# Patient Record
Sex: FEMALE | Race: WHITE | NOT HISPANIC OR LATINO | Employment: FULL TIME | URBAN - METROPOLITAN AREA
[De-identification: names, ages, dates, MRNs, and addresses within clinical notes are randomized per-mention and may not be internally consistent; named-entity substitution may affect disease eponyms.]

---

## 2020-04-28 LAB
EXTERNAL ABO GROUPING: NORMAL
EXTERNAL ANTIBODY SCREEN: NEGATIVE
EXTERNAL GROUP B STREP ANTIGEN: NORMAL
EXTERNAL HEPATITIS B SURFACE ANTIGEN: NEGATIVE
EXTERNAL HEPATITIS C AB: NORMAL
EXTERNAL RH FACTOR: NEGATIVE
EXTERNAL RUBELLA QUALITATIVE: NORMAL
EXTERNAL SYPHILIS RPR SCREEN: NORMAL
HIV1 P24 AG SERPL QL IA: NEGATIVE

## 2020-11-17 ENCOUNTER — ANESTHESIA (OUTPATIENT)
Dept: LABOR AND DELIVERY | Facility: HOSPITAL | Age: 30
End: 2020-11-17

## 2020-11-17 ENCOUNTER — HOSPITAL ENCOUNTER (INPATIENT)
Facility: HOSPITAL | Age: 30
LOS: 4 days | Discharge: HOME | End: 2020-11-21
Attending: OBSTETRICS & GYNECOLOGY | Admitting: OBSTETRICS & GYNECOLOGY

## 2020-11-17 ENCOUNTER — ANESTHESIA EVENT (OUTPATIENT)
Dept: LABOR AND DELIVERY | Facility: HOSPITAL | Age: 30
End: 2020-11-17

## 2020-11-17 PROBLEM — Z34.90 PREGNANCY: Status: ACTIVE | Noted: 2020-11-17

## 2020-11-17 LAB
ABO GROUP BLD: NORMAL
BLD GP AB SCN SERPL QL: POSITIVE
DEPRECATED RDW RBC AUTO: 40.9 FL (ref 37–54)
ERYTHROCYTE [DISTWIDTH] IN BLOOD BY AUTOMATED COUNT: 12.3 % (ref 12.3–15.4)
HCT VFR BLD AUTO: 37.2 % (ref 34–46.6)
HGB BLD-MCNC: 12.4 G/DL (ref 12–15.9)
MCH RBC QN AUTO: 30.6 PG (ref 26.6–33)
MCHC RBC AUTO-ENTMCNC: 33.3 G/DL (ref 31.5–35.7)
MCV RBC AUTO: 91.9 FL (ref 79–97)
PLATELET # BLD AUTO: 250 10*3/MM3 (ref 140–450)
PMV BLD AUTO: 11.4 FL (ref 6–12)
RBC # BLD AUTO: 4.05 10*6/MM3 (ref 3.77–5.28)
RESIDUAL RHIG DETECTED: NORMAL
RH BLD: NEGATIVE
SARS-COV-2 RDRP RESP QL NAA+PROBE: NOT DETECTED
T&S EXPIRATION DATE: NORMAL
WBC # BLD AUTO: 15.84 10*3/MM3 (ref 3.4–10.8)

## 2020-11-17 PROCEDURE — 86870 RBC ANTIBODY IDENTIFICATION: CPT | Performed by: OBSTETRICS & GYNECOLOGY

## 2020-11-17 PROCEDURE — 25010000002 HYDROMORPHONE PER 4 MG: Performed by: ANESTHESIOLOGY

## 2020-11-17 PROCEDURE — 25010000002 ONDANSETRON PER 1 MG: Performed by: ANESTHESIOLOGY

## 2020-11-17 PROCEDURE — 88307 TISSUE EXAM BY PATHOLOGIST: CPT

## 2020-11-17 PROCEDURE — 85027 COMPLETE CBC AUTOMATED: CPT | Performed by: OBSTETRICS & GYNECOLOGY

## 2020-11-17 PROCEDURE — 86850 RBC ANTIBODY SCREEN: CPT | Performed by: OBSTETRICS & GYNECOLOGY

## 2020-11-17 PROCEDURE — 87635 SARS-COV-2 COVID-19 AMP PRB: CPT | Performed by: OBSTETRICS & GYNECOLOGY

## 2020-11-17 PROCEDURE — 25010000002 FENTANYL CITRATE (PF) 100 MCG/2ML SOLUTION: Performed by: ANESTHESIOLOGY

## 2020-11-17 PROCEDURE — 25010000002 MORPHINE PER 10 MG: Performed by: ANESTHESIOLOGY

## 2020-11-17 PROCEDURE — 86901 BLOOD TYPING SEROLOGIC RH(D): CPT | Performed by: OBSTETRICS & GYNECOLOGY

## 2020-11-17 PROCEDURE — 25010000002 PHENYLEPHRINE PER 1 ML: Performed by: ANESTHESIOLOGY

## 2020-11-17 PROCEDURE — 25010000003 CEFAZOLIN IN DEXTROSE 2-4 GM/100ML-% SOLUTION: Performed by: OBSTETRICS & GYNECOLOGY

## 2020-11-17 PROCEDURE — 86900 BLOOD TYPING SEROLOGIC ABO: CPT | Performed by: OBSTETRICS & GYNECOLOGY

## 2020-11-17 RX ORDER — HYDROMORPHONE HYDROCHLORIDE 1 MG/ML
0.5 INJECTION, SOLUTION INTRAMUSCULAR; INTRAVENOUS; SUBCUTANEOUS
Status: DISCONTINUED | OUTPATIENT
Start: 2020-11-17 | End: 2020-11-17 | Stop reason: HOSPADM

## 2020-11-17 RX ORDER — ONDANSETRON 4 MG/1
4 TABLET, FILM COATED ORAL EVERY 8 HOURS PRN
Status: DISCONTINUED | OUTPATIENT
Start: 2020-11-17 | End: 2020-11-21 | Stop reason: HOSPADM

## 2020-11-17 RX ORDER — FAMOTIDINE 10 MG/ML
20 INJECTION, SOLUTION INTRAVENOUS ONCE AS NEEDED
Status: COMPLETED | OUTPATIENT
Start: 2020-11-17 | End: 2020-11-17

## 2020-11-17 RX ORDER — MORPHINE SULFATE 2 MG/ML
2 INJECTION, SOLUTION INTRAMUSCULAR; INTRAVENOUS EVERY 4 HOURS PRN
Status: DISCONTINUED | OUTPATIENT
Start: 2020-11-17 | End: 2020-11-21 | Stop reason: HOSPADM

## 2020-11-17 RX ORDER — DIPHENHYDRAMINE HCL 25 MG
25 CAPSULE ORAL EVERY 4 HOURS PRN
Status: DISCONTINUED | OUTPATIENT
Start: 2020-11-17 | End: 2020-11-21 | Stop reason: HOSPADM

## 2020-11-17 RX ORDER — HYDROCORTISONE 25 MG/G
CREAM TOPICAL 3 TIMES DAILY PRN
Status: DISCONTINUED | OUTPATIENT
Start: 2020-11-17 | End: 2020-11-21 | Stop reason: HOSPADM

## 2020-11-17 RX ORDER — OXYTOCIN-SODIUM CHLORIDE 0.9% IV SOLN 30 UNIT/500ML 30-0.9/5 UT/ML-%
125 SOLUTION INTRAVENOUS CONTINUOUS PRN
Status: COMPLETED | OUTPATIENT
Start: 2020-11-17 | End: 2020-11-17

## 2020-11-17 RX ORDER — GLYCOPYRROLATE 0.2 MG/ML
INJECTION INTRAMUSCULAR; INTRAVENOUS AS NEEDED
Status: DISCONTINUED | OUTPATIENT
Start: 2020-11-17 | End: 2020-11-17 | Stop reason: SURG

## 2020-11-17 RX ORDER — MORPHINE SULFATE 1 MG/ML
INJECTION, SOLUTION EPIDURAL; INTRATHECAL; INTRAVENOUS
Status: COMPLETED | OUTPATIENT
Start: 2020-11-17 | End: 2020-11-17

## 2020-11-17 RX ORDER — MISOPROSTOL 200 UG/1
800 TABLET ORAL AS NEEDED
Status: DISCONTINUED | OUTPATIENT
Start: 2020-11-17 | End: 2020-11-17 | Stop reason: HOSPADM

## 2020-11-17 RX ORDER — LANOLIN
CREAM (ML) TOPICAL
Status: DISCONTINUED | OUTPATIENT
Start: 2020-11-17 | End: 2020-11-21 | Stop reason: HOSPADM

## 2020-11-17 RX ORDER — SODIUM CHLORIDE 0.9 % (FLUSH) 0.9 %
3 SYRINGE (ML) INJECTION EVERY 12 HOURS SCHEDULED
Status: DISCONTINUED | OUTPATIENT
Start: 2020-11-17 | End: 2020-11-19

## 2020-11-17 RX ORDER — OXYCODONE AND ACETAMINOPHEN 10; 325 MG/1; MG/1
1 TABLET ORAL EVERY 4 HOURS PRN
Status: DISCONTINUED | OUTPATIENT
Start: 2020-11-17 | End: 2020-11-21 | Stop reason: HOSPADM

## 2020-11-17 RX ORDER — BUPIVACAINE HYDROCHLORIDE 7.5 MG/ML
INJECTION, SOLUTION EPIDURAL; RETROBULBAR
Status: COMPLETED | OUTPATIENT
Start: 2020-11-17 | End: 2020-11-17

## 2020-11-17 RX ORDER — CARBOPROST TROMETHAMINE 250 UG/ML
250 INJECTION, SOLUTION INTRAMUSCULAR AS NEEDED
Status: DISCONTINUED | OUTPATIENT
Start: 2020-11-17 | End: 2020-11-17 | Stop reason: HOSPADM

## 2020-11-17 RX ORDER — HYDROXYZINE 50 MG/1
50 TABLET, FILM COATED ORAL EVERY 6 HOURS PRN
Status: DISCONTINUED | OUTPATIENT
Start: 2020-11-17 | End: 2020-11-21 | Stop reason: HOSPADM

## 2020-11-17 RX ORDER — MORPHINE SULFATE 2 MG/ML
2 INJECTION, SOLUTION INTRAMUSCULAR; INTRAVENOUS
Status: ACTIVE | OUTPATIENT
Start: 2020-11-17 | End: 2020-11-18

## 2020-11-17 RX ORDER — ONDANSETRON 2 MG/ML
4 INJECTION INTRAMUSCULAR; INTRAVENOUS ONCE AS NEEDED
Status: COMPLETED | OUTPATIENT
Start: 2020-11-17 | End: 2020-11-17

## 2020-11-17 RX ORDER — SIMETHICONE 80 MG
80 TABLET,CHEWABLE ORAL 4 TIMES DAILY PRN
Status: DISCONTINUED | OUTPATIENT
Start: 2020-11-17 | End: 2020-11-21 | Stop reason: HOSPADM

## 2020-11-17 RX ORDER — FENTANYL CITRATE 50 UG/ML
INJECTION, SOLUTION INTRAMUSCULAR; INTRAVENOUS
Status: COMPLETED | OUTPATIENT
Start: 2020-11-17 | End: 2020-11-17

## 2020-11-17 RX ORDER — ERYTHROMYCIN 5 MG/G
OINTMENT OPHTHALMIC
Status: DISPENSED
Start: 2020-11-17 | End: 2020-11-17

## 2020-11-17 RX ORDER — DIPHENHYDRAMINE HYDROCHLORIDE 50 MG/ML
25 INJECTION INTRAMUSCULAR; INTRAVENOUS EVERY 4 HOURS PRN
Status: DISCONTINUED | OUTPATIENT
Start: 2020-11-17 | End: 2020-11-21 | Stop reason: HOSPADM

## 2020-11-17 RX ORDER — CEFAZOLIN SODIUM 2 G/100ML
2 INJECTION, SOLUTION INTRAVENOUS ONCE
Status: COMPLETED | OUTPATIENT
Start: 2020-11-17 | End: 2020-11-17

## 2020-11-17 RX ORDER — PRENATAL VIT NO.126/IRON/FOLIC 28MG-0.8MG
1 TABLET ORAL DAILY
COMMUNITY

## 2020-11-17 RX ORDER — IBUPROFEN 800 MG/1
800 TABLET ORAL EVERY 8 HOURS PRN
Status: DISCONTINUED | OUTPATIENT
Start: 2020-11-17 | End: 2020-11-21 | Stop reason: HOSPADM

## 2020-11-17 RX ORDER — PROMETHAZINE HYDROCHLORIDE 12.5 MG/1
12.5 SUPPOSITORY RECTAL EVERY 6 HOURS PRN
Status: DISCONTINUED | OUTPATIENT
Start: 2020-11-17 | End: 2020-11-21 | Stop reason: HOSPADM

## 2020-11-17 RX ORDER — PROMETHAZINE HYDROCHLORIDE 25 MG/1
25 TABLET ORAL EVERY 6 HOURS PRN
Status: DISCONTINUED | OUTPATIENT
Start: 2020-11-17 | End: 2020-11-21 | Stop reason: HOSPADM

## 2020-11-17 RX ORDER — DOCUSATE SODIUM 100 MG/1
100 CAPSULE, LIQUID FILLED ORAL 2 TIMES DAILY PRN
Status: DISCONTINUED | OUTPATIENT
Start: 2020-11-17 | End: 2020-11-21 | Stop reason: HOSPADM

## 2020-11-17 RX ORDER — ALBUTEROL SULFATE 90 UG/1
2 AEROSOL, METERED RESPIRATORY (INHALATION) EVERY 4 HOURS PRN
COMMUNITY

## 2020-11-17 RX ORDER — OXYTOCIN-SODIUM CHLORIDE 0.9% IV SOLN 30 UNIT/500ML 30-0.9/5 UT/ML-%
999 SOLUTION INTRAVENOUS ONCE
Status: COMPLETED | OUTPATIENT
Start: 2020-11-17 | End: 2020-11-17

## 2020-11-17 RX ORDER — ONDANSETRON 2 MG/ML
4 INJECTION INTRAMUSCULAR; INTRAVENOUS EVERY 8 HOURS PRN
Status: DISCONTINUED | OUTPATIENT
Start: 2020-11-17 | End: 2020-11-21 | Stop reason: HOSPADM

## 2020-11-17 RX ORDER — SODIUM CHLORIDE 0.9 % (FLUSH) 0.9 %
3-10 SYRINGE (ML) INJECTION AS NEEDED
Status: DISCONTINUED | OUTPATIENT
Start: 2020-11-17 | End: 2020-11-21 | Stop reason: HOSPADM

## 2020-11-17 RX ORDER — SODIUM CHLORIDE, SODIUM LACTATE, POTASSIUM CHLORIDE, CALCIUM CHLORIDE 600; 310; 30; 20 MG/100ML; MG/100ML; MG/100ML; MG/100ML
125 INJECTION, SOLUTION INTRAVENOUS CONTINUOUS
Status: DISCONTINUED | OUTPATIENT
Start: 2020-11-17 | End: 2020-11-17

## 2020-11-17 RX ORDER — OXYCODONE HYDROCHLORIDE AND ACETAMINOPHEN 5; 325 MG/1; MG/1
1 TABLET ORAL EVERY 4 HOURS PRN
Status: DISCONTINUED | OUTPATIENT
Start: 2020-11-17 | End: 2020-11-21 | Stop reason: HOSPADM

## 2020-11-17 RX ORDER — NALOXONE HCL 0.4 MG/ML
0.2 VIAL (ML) INJECTION
Status: DISCONTINUED | OUTPATIENT
Start: 2020-11-17 | End: 2020-11-21 | Stop reason: HOSPADM

## 2020-11-17 RX ORDER — METHYLERGONOVINE MALEATE 0.2 MG/ML
200 INJECTION INTRAVENOUS ONCE AS NEEDED
Status: DISCONTINUED | OUTPATIENT
Start: 2020-11-17 | End: 2020-11-17 | Stop reason: HOSPADM

## 2020-11-17 RX ORDER — MISOPROSTOL 200 UG/1
600 TABLET ORAL ONCE AS NEEDED
Status: DISCONTINUED | OUTPATIENT
Start: 2020-11-17 | End: 2020-11-21 | Stop reason: HOSPADM

## 2020-11-17 RX ORDER — ACETAMINOPHEN 325 MG/1
650 TABLET ORAL ONCE
Status: COMPLETED | OUTPATIENT
Start: 2020-11-17 | End: 2020-11-17

## 2020-11-17 RX ORDER — SODIUM CHLORIDE 0.9 % (FLUSH) 0.9 %
10 SYRINGE (ML) INJECTION AS NEEDED
Status: DISCONTINUED | OUTPATIENT
Start: 2020-11-17 | End: 2020-11-17 | Stop reason: HOSPADM

## 2020-11-17 RX ORDER — PHYTONADIONE 1 MG/.5ML
INJECTION, EMULSION INTRAMUSCULAR; INTRAVENOUS; SUBCUTANEOUS
Status: DISPENSED
Start: 2020-11-17 | End: 2020-11-17

## 2020-11-17 RX ORDER — SODIUM CHLORIDE, SODIUM LACTATE, POTASSIUM CHLORIDE, CALCIUM CHLORIDE 600; 310; 30; 20 MG/100ML; MG/100ML; MG/100ML; MG/100ML
125 INJECTION, SOLUTION INTRAVENOUS CONTINUOUS
Status: DISCONTINUED | OUTPATIENT
Start: 2020-11-17 | End: 2020-11-21 | Stop reason: HOSPADM

## 2020-11-17 RX ORDER — EPHEDRINE SULFATE 50 MG/ML
INJECTION, SOLUTION INTRAVENOUS AS NEEDED
Status: DISCONTINUED | OUTPATIENT
Start: 2020-11-17 | End: 2020-11-17 | Stop reason: SURG

## 2020-11-17 RX ORDER — OXYTOCIN-SODIUM CHLORIDE 0.9% IV SOLN 30 UNIT/500ML 30-0.9/5 UT/ML-%
250 SOLUTION INTRAVENOUS CONTINUOUS PRN
Status: ACTIVE | OUTPATIENT
Start: 2020-11-17 | End: 2020-11-17

## 2020-11-17 RX ORDER — NALOXONE HCL 0.4 MG/ML
0.4 VIAL (ML) INJECTION
Status: DISCONTINUED | OUTPATIENT
Start: 2020-11-17 | End: 2020-11-21 | Stop reason: HOSPADM

## 2020-11-17 RX ADMIN — IBUPROFEN 800 MG: 800 TABLET ORAL at 14:51

## 2020-11-17 RX ADMIN — OXYTOCIN 125 ML/HR: 10 INJECTION INTRAVENOUS at 11:23

## 2020-11-17 RX ADMIN — IBUPROFEN 800 MG: 800 TABLET ORAL at 22:24

## 2020-11-17 RX ADMIN — MORPHINE SULFATE 250 MCG: 1 INJECTION, SOLUTION EPIDURAL; INTRATHECAL; INTRAVENOUS at 09:20

## 2020-11-17 RX ADMIN — SODIUM CHLORIDE, POTASSIUM CHLORIDE, SODIUM LACTATE AND CALCIUM CHLORIDE 1000 ML: 600; 310; 30; 20 INJECTION, SOLUTION INTRAVENOUS at 07:20

## 2020-11-17 RX ADMIN — FAMOTIDINE 20 MG: 10 INJECTION INTRAVENOUS at 08:55

## 2020-11-17 RX ADMIN — HYDROMORPHONE HYDROCHLORIDE 0.5 MG: 1 INJECTION, SOLUTION INTRAMUSCULAR; INTRAVENOUS; SUBCUTANEOUS at 11:22

## 2020-11-17 RX ADMIN — HYDROMORPHONE HYDROCHLORIDE 0.5 MG: 1 INJECTION, SOLUTION INTRAMUSCULAR; INTRAVENOUS; SUBCUTANEOUS at 12:10

## 2020-11-17 RX ADMIN — ACETAMINOPHEN 650 MG: 325 TABLET, FILM COATED ORAL at 08:56

## 2020-11-17 RX ADMIN — OXYTOCIN 999 ML/HR: 10 INJECTION INTRAVENOUS at 09:36

## 2020-11-17 RX ADMIN — SODIUM CHLORIDE, POTASSIUM CHLORIDE, SODIUM LACTATE AND CALCIUM CHLORIDE 125 ML/HR: 600; 310; 30; 20 INJECTION, SOLUTION INTRAVENOUS at 09:07

## 2020-11-17 RX ADMIN — FENTANYL CITRATE 20 MCG: 50 INJECTION INTRAMUSCULAR; INTRAVENOUS at 09:20

## 2020-11-17 RX ADMIN — OXYCODONE HYDROCHLORIDE AND ACETAMINOPHEN 1 TABLET: 10; 325 TABLET ORAL at 22:24

## 2020-11-17 RX ADMIN — EPHEDRINE SULFATE 5 MG: 50 INJECTION INTRAVENOUS at 09:24

## 2020-11-17 RX ADMIN — CEFAZOLIN SODIUM 2 G: 2 INJECTION, SOLUTION INTRAVENOUS at 09:06

## 2020-11-17 RX ADMIN — BUPIVACAINE HYDROCHLORIDE 1.6 ML: 7.5 INJECTION, SOLUTION EPIDURAL; RETROBULBAR at 09:20

## 2020-11-17 RX ADMIN — OXYCODONE HYDROCHLORIDE AND ACETAMINOPHEN 1 TABLET: 10; 325 TABLET ORAL at 14:51

## 2020-11-17 RX ADMIN — ONDANSETRON HYDROCHLORIDE 4 MG: 2 SOLUTION INTRAMUSCULAR; INTRAVENOUS at 13:28

## 2020-11-17 RX ADMIN — DOCUSATE SODIUM 100 MG: 100 CAPSULE ORAL at 22:24

## 2020-11-17 RX ADMIN — GLYCOPYRROLATE 0.2 MG: 0.2 INJECTION INTRAMUSCULAR; INTRAVENOUS at 09:29

## 2020-11-17 RX ADMIN — PHENYLEPHRINE HYDROCHLORIDE 100 MCG: 10 INJECTION INTRAVENOUS at 09:24

## 2020-11-17 NOTE — ANESTHESIA PREPROCEDURE EVALUATION
Anesthesia Evaluation     Patient summary reviewed and Nursing notes reviewed   NPO Solid Status: > 8 hours  NPO Liquid Status: > 8 hours           Airway   Mallampati: II  Neck ROM: full  No difficulty expected  Dental - normal exam     Pulmonary     breath sounds clear to auscultation  (+) asthma,  Cardiovascular     Rhythm: regular        Neuro/Psych  GI/Hepatic/Renal/Endo      Musculoskeletal     Abdominal    Substance History      OB/GYN    (+) Pregnant,         Other                        Anesthesia Plan    ASA 2     spinal   (  39w0d  breech)    Anesthetic plan, all risks, benefits, and alternatives have been provided, discussed and informed consent has been obtained with: patient.

## 2020-11-17 NOTE — ANESTHESIA PROCEDURE NOTES
Spinal Block      Patient reassessed immediately prior to procedure    Patient location during procedure: OR  Indication:at surgeon's request  Performed By  Anesthesiologist: Wily Olivera MD  Preanesthetic Checklist  Completed: patient identified, site marked, surgical consent, pre-op evaluation, timeout performed, IV checked, risks and benefits discussed and monitors and equipment checked  Spinal Block Prep:  Patient Position:sitting  Sterile Tech:cap, gloves, mask and sterile barriers  Prep:Chloraprep  Patient Monitoring:blood pressure monitoring and continuous pulse oximetry  Spinal Block Procedure  Approach:midline  Guidance:landmark technique  Location:L3-L4  Needle Type:Sprotte  Needle Gauge:24  Placement of Spinal needle event:cerebrospinal fluid aspirated  Paresthesia: no  Fluid Appearance:clear  Medications: bupivacaine PF (MARCAINE) 0.75 % injection, 1.6 mL  Morphine PF injection, 250 mcg  fentaNYL citrate (PF) (SUBLIMAZE) injection, 20 mcg  Med Administered at 11/17/2020 9:20 AM   Post Assessment  Patient Tolerance:patient tolerated the procedure well with no apparent complications  Complications no

## 2020-11-17 NOTE — LACTATION NOTE
This note was copied from a baby's chart.  P1 term baby, 8 hrs old, has nursed a few times per Mom. Discussed how to know baby is getting enough breast milk. She has a personal pump.

## 2020-11-17 NOTE — H&P
"[facility]  Obstetric Preop History and Physical:    Patient Name: Wilda Phelps  :  1990  MRN:  [id number]      Chief Complaint   Patient presents with   • Scheduled      39wk breech       Subjective                30 y.o. female  currently at 39w0d, who presents for primary ltcd secondary to breech presentation. She did not want a version.  Her pnc has been complicated by rh negative status and mild asthma.  She is having a girl \"yesica\".               Past OB History:       OB History    Para Term  AB Living   1 0 0 0 0 0   SAB TAB Ectopic Molar Multiple Live Births   0 0 0 0 0 0      # Outcome Date GA Lbr Zac/2nd Weight Sex Delivery Anes PTL Lv   1 Current                Past Medical History: Past Medical History:   Diagnosis Date   • Abnormal Pap smear of cervix    • Asthma       Past Surgical History Past Surgical History:   Procedure Laterality Date   • ANKLE ARTHROSCOPY Right ,       Family History: History reviewed. No pertinent family history.   Social History:  reports that she has never smoked. She has never used smokeless tobacco.   reports previous alcohol use.   reports no history of drug use.    Allergies:     Patient has no known allergies.     Objective       Vital Signs Range for the last 24 hours  Temperature: Temp:  [98 °F (36.7 °C)] 98 °F (36.7 °C)   Temp Source: Temp src: Oral   BP: BP: (116)/(58) 116/58   Pulse: Heart Rate:  [81] 81   Respirations: Resp:  [18] 18   SPO2: SpO2:  [98 %] 98 %      lactated ringers, 125 mL/hr, Last Rate: 125 mL/hr (20 0907)  oxytocin, 250 mL/hr    Followed by  oxytocin, 125 mL/hr                       Physical Exam:  General: Alert in NAD   Heart Normal rate and regular rhythm   Lungs Clear to auscultation bilaterally     Abdomen Gravid, soft, no masses   Extremities  edema         Cervix: Exam by:     Dilation:     Effacement:     Station:                               FHR:   TOCO:            Assessment/Plan "     Assessment: 39wks g1 with breech presentation for primary ltcd    Plan: Recommend proceeding with  section for delivery. Indications for proceeding, risks and benefits have been discussed with patient. All questions answered.         Whit Julio MD  2020  09:07 EST

## 2020-11-17 NOTE — PLAN OF CARE
Goal Outcome Evaluation:  Plan of Care Reviewed With: patient  Progress: improving  Outcome Summary: Stable, pain well controlled with Po pain meds, all initial checks complete, Iv fluids completed, sliv, mom is attempting to breastfeed

## 2020-11-17 NOTE — ANESTHESIA POSTPROCEDURE EVALUATION
"Patient: Wilda Phelps    Procedure Summary     Date: 20 Room / Location:  [facility]    Anesthesia Start: 909 Anesthesia Stop:     Procedure:  SECTION PRIMARY (N/A Abdomen) Diagnosis:     Surgeon: Whit Julio MD Provider: Wily Olivera MD    Anesthesia Type: spinal ASA Status: 2          Anesthesia Type: spinal    Vitals  Vitals Value Taken Time   /70 20 1146   Temp 36.4 °C (97.5 °F) 20 1013   Pulse 61 20 1150   Resp 17 20 1200   SpO2 99 % 20 1150           Post Anesthesia Care and Evaluation    Patient location during evaluation: bedside  Patient participation: complete - patient participated  Level of consciousness: sleepy but conscious  Pain score: 0  Pain management: adequate  Airway patency: patent  Anesthetic complications: No anesthetic complications    Cardiovascular status: acceptable  Respiratory status: acceptable  Hydration status: acceptable    Comments: /70   Pulse 61   Temp 36.4 °C (97.5 °F) (Oral)   Resp 17   Ht 167.6 cm (66\")   Wt 73.9 kg (163 lb)   SpO2 99%   Breastfeeding Yes   BMI 26.31 kg/m²         "

## 2020-11-18 LAB
BASOPHILS # BLD AUTO: 0.08 10*3/MM3 (ref 0–0.2)
BASOPHILS NFR BLD AUTO: 0.5 % (ref 0–1.5)
DEPRECATED RDW RBC AUTO: 41.3 FL (ref 37–54)
EOSINOPHIL # BLD AUTO: 0.36 10*3/MM3 (ref 0–0.4)
EOSINOPHIL NFR BLD AUTO: 2.3 % (ref 0.3–6.2)
ERYTHROCYTE [DISTWIDTH] IN BLOOD BY AUTOMATED COUNT: 12.4 % (ref 12.3–15.4)
HCT VFR BLD AUTO: 32.7 % (ref 34–46.6)
HGB BLD-MCNC: 10.8 G/DL (ref 12–15.9)
IMM GRANULOCYTES # BLD AUTO: 0.06 10*3/MM3 (ref 0–0.05)
IMM GRANULOCYTES NFR BLD AUTO: 0.4 % (ref 0–0.5)
LYMPHOCYTES # BLD AUTO: 2.99 10*3/MM3 (ref 0.7–3.1)
LYMPHOCYTES NFR BLD AUTO: 19.3 % (ref 19.6–45.3)
MCH RBC QN AUTO: 30.6 PG (ref 26.6–33)
MCHC RBC AUTO-ENTMCNC: 33 G/DL (ref 31.5–35.7)
MCV RBC AUTO: 92.6 FL (ref 79–97)
MONOCYTES # BLD AUTO: 1.05 10*3/MM3 (ref 0.1–0.9)
MONOCYTES NFR BLD AUTO: 6.8 % (ref 5–12)
NEUTROPHILS NFR BLD AUTO: 10.99 10*3/MM3 (ref 1.7–7)
NEUTROPHILS NFR BLD AUTO: 70.7 % (ref 42.7–76)
NRBC BLD AUTO-RTO: 0 /100 WBC (ref 0–0.2)
PLATELET # BLD AUTO: 213 10*3/MM3 (ref 140–450)
PMV BLD AUTO: 11.6 FL (ref 6–12)
RBC # BLD AUTO: 3.53 10*6/MM3 (ref 3.77–5.28)
WBC # BLD AUTO: 15.53 10*3/MM3 (ref 3.4–10.8)

## 2020-11-18 PROCEDURE — 85025 COMPLETE CBC W/AUTO DIFF WBC: CPT | Performed by: OBSTETRICS & GYNECOLOGY

## 2020-11-18 RX ADMIN — OXYCODONE HYDROCHLORIDE AND ACETAMINOPHEN 1 TABLET: 10; 325 TABLET ORAL at 09:05

## 2020-11-18 RX ADMIN — OXYCODONE HYDROCHLORIDE AND ACETAMINOPHEN 1 TABLET: 10; 325 TABLET ORAL at 04:26

## 2020-11-18 RX ADMIN — OXYCODONE HYDROCHLORIDE AND ACETAMINOPHEN 1 TABLET: 10; 325 TABLET ORAL at 14:48

## 2020-11-18 RX ADMIN — OXYCODONE HYDROCHLORIDE AND ACETAMINOPHEN 1 TABLET: 10; 325 TABLET ORAL at 18:54

## 2020-11-18 RX ADMIN — DOCUSATE SODIUM 100 MG: 100 CAPSULE ORAL at 21:02

## 2020-11-18 RX ADMIN — OXYCODONE HYDROCHLORIDE AND ACETAMINOPHEN 1 TABLET: 10; 325 TABLET ORAL at 22:50

## 2020-11-18 RX ADMIN — IBUPROFEN 800 MG: 800 TABLET ORAL at 08:35

## 2020-11-18 RX ADMIN — DOCUSATE SODIUM 100 MG: 100 CAPSULE ORAL at 08:36

## 2020-11-18 RX ADMIN — IBUPROFEN 800 MG: 800 TABLET ORAL at 17:28

## 2020-11-18 NOTE — LACTATION NOTE
This note was copied from a baby's chart.  Mother reports that infant has been nursing/voiding well, denies any questions or concerns. Denies any pain or discomfort. Encouraged mother to call with any needs.

## 2020-11-18 NOTE — L&D DELIVERY NOTE
[facility]   Section Operative Note    Patient Name: Wilda Phelps  :  1990  MRN:  [id number]      Date of procedure:  2020        Pre-Operative Dx:   1.  IUP at 39w0d weeks   2. Breech         Postoperative Dx:  Same        Procedure: Primary    via pfannensteil      Surgeon: Whit Julio MD      Assistant: Dr burris     Anesthesia: Spinal      EBL:    Quantitative EBL:  500cc    Quantitative Blood Loss (mL): 311 mL         Specimens: None  A: placenta     Findings:                           Amniotic Fluid nl  Placenta Intact, 3 VC.. manual removal due to very adherent to uterus  Uterus normal  Tubes and ovaries normal bilaterally              Infant:           Gender: Viable female  infant     Weight: 2565 g (5 lb 10.5 oz)     Apgars: 8  @ 1 minute /     9  @ 5 minutes                 Indication for C/Section:     Breech               Procedure Details:  The patient was taken to the operating room where spinal anesthesia was found to be adequate. She was prepped and draped in the usual sterile manner in the dorsal supine position with leftward tilt. A Pfannenstiel incision was made and carried down to the fascia. The fascia was incised in the mid-line and extended transversely with Hilario scissors. The fascia was grasped and elevated and  from the underlying rectus muscles superiorly and inferiorly. The peritoneum was identified and entered. Peritoneal incision was extended superiorly and inferiorly with good visualization of the bladder. The bladder blade was inserted and the vesicouterine peritoneum was incised transversely and the bladder flap was created digitally. The bladder blade was reinserted. The  lower uterine segment was incised in a transverse fashion.  The buttocks/hip were elevated and delivered through the incision. Followed by the torson, arms and the head was then flexed and easily delivered.  The remainder of the infant was delivered without  difficulty. The umbilical cord was clamped and cut and the infant was handed off the field. Cord ph and cord bloods were obtained. The placenta was removed intact and appeared normal. The uterine incision was closed with running locked sutures of 0vicryl. Second layer closure was placed imbricating the first for hemostasis. The abdominal cavity was irrigated and cleared of all clot and debris. The uterine incision was inspected and noted to be hemostatic. Rectus muscles were reapproximated in the midline with 0 vicryl.  The fascia was closed with 0 vicryl in running continuous fashion. The subcutaneous tissue was closed with 3-0 vicyrl. The skin was closed in subcuticular fashion with 4-0 Vicryl.   Instrument, sponge, and needle counts were correct prior the abdominal closure and at the conclusion of the case. Mother and baby  to recovery room in stable condition.              Complications:     None          Antibiotics: cefazolin (Ancef)                      Whit Julio MD  11/18/2020  10:13 EST

## 2020-11-18 NOTE — PROGRESS NOTES
[facility]   PROGRESS NOTE    Patient Name: Wilda Phelps  :  1990  MRN:  [id number]      Post-Op Day 1 S/P    Delivered a female infant.  Subjective     Patient reports:    Pain is well controlled. Voiding and ambulating without difficulty.    Tolerating po. Lochia normal.       The patient plans to breastfeed.         Objective       Vitals: Vital Signs Range for the last 24 hours  Temperature: Temp:  [97.3 °F (36.3 °C)-98.3 °F (36.8 °C)] 98.1 °F (36.7 °C)   Temp Source: Temp src: Oral   BP: BP: (102-131)/(61-82) 102/61   Pulse: Heart Rate:  [51-94] 51   Respirations: Resp:  [16-20] 16         Intake/Output Summary (Last 24 hours) at 2020 0944  Last data filed at 2020 0748  Gross per 24 hour   Intake 1870 ml   Output 3411 ml   Net -1541 ml                                              Physical Exam     General Alert and awake, in NAD      CV Regular rate and rhythm      Lungs clear to auscultation bilaterally        Abdomen Soft, non-distended, fundus firm,  1 below umbilicus, appropriately tender      Incision  Dressing clean, dry and intact.      Extremities  tr edema, calves NT               LABS: Results from last 7 days   Lab Units 20  0531 20  0711   WBC 10*3/mm3 15.53* 15.84*   HEMOGLOBIN g/dL 10.8* 12.4   HEMATOCRIT % 32.7* 37.2   PLATELETS 10*3/mm3 213 250         Prenatal labs results reviewed:  Yes   Rubella:  Immune  Rh Status:    RH type   Date Value Ref Range Status   2020 Negative  Final                       Assessment/Plan   : 1. POD 1 S/P C/S: Hemodynamically stable.  Doing well.  Continue routine care.       Pregnancy          Eulalia Haines, APRN  2020  09:44 EST

## 2020-11-19 RX ADMIN — OXYCODONE HYDROCHLORIDE AND ACETAMINOPHEN 1 TABLET: 10; 325 TABLET ORAL at 20:42

## 2020-11-19 RX ADMIN — DOCUSATE SODIUM 100 MG: 100 CAPSULE ORAL at 20:42

## 2020-11-19 RX ADMIN — DOCUSATE SODIUM 100 MG: 100 CAPSULE ORAL at 08:02

## 2020-11-19 RX ADMIN — IBUPROFEN 800 MG: 800 TABLET ORAL at 20:42

## 2020-11-19 RX ADMIN — OXYCODONE HYDROCHLORIDE AND ACETAMINOPHEN 1 TABLET: 10; 325 TABLET ORAL at 16:47

## 2020-11-19 RX ADMIN — OXYCODONE HYDROCHLORIDE AND ACETAMINOPHEN 1 TABLET: 10; 325 TABLET ORAL at 03:07

## 2020-11-19 RX ADMIN — OXYCODONE HYDROCHLORIDE AND ACETAMINOPHEN 1 TABLET: 10; 325 TABLET ORAL at 11:44

## 2020-11-19 RX ADMIN — IBUPROFEN 800 MG: 800 TABLET ORAL at 11:44

## 2020-11-19 RX ADMIN — OXYCODONE HYDROCHLORIDE AND ACETAMINOPHEN 1 TABLET: 10; 325 TABLET ORAL at 06:50

## 2020-11-19 RX ADMIN — IBUPROFEN 800 MG: 800 TABLET ORAL at 03:07

## 2020-11-19 RX ADMIN — Medication: at 03:39

## 2020-11-19 NOTE — LACTATION NOTE
P1. Baby is small but nursing well per mom. Staying another day. Patient has questions about when to start pumping ,eyc. Has medela pump at home. Discussed how and when to start pumping and introducing bottle . Directed to safe storage of expressed milk in provided booklet.  Maternal hydration encouraged. Knows to call LC as needed.

## 2020-11-19 NOTE — PROGRESS NOTES
[facility]   PROGRESS NOTE    Patient Name: Wilda Phelps  :  1990  MRN:  [id number]      Post-Op 2 S/P   Subjective     Patient reports:   Doing well. Pain well controlled. Tolerating regular diet and having flatus.    Lochia normal.       Objective       Vitals: Vital Signs Range for the last 24 hours  Temperature: Temp:  [97.6 °F (36.4 °C)-98 °F (36.7 °C)] 98 °F (36.7 °C)       BP: BP: (107-111)/(61-66) 110/65   Pulse: Heart Rate:  [59-61] 61   Respirations: Resp:  [16] 16                                         Physical Exam     General Alert       Abdomen Soft, non-distended, fundus firm, 1 below umbilicus, appropriately tender      Incision  Intact, no erythema or exudate      Extremities Calves NT bilaterally                Assessment/Plan  :   POD 2  -  Doing well.  Continue usual cares.           Pregnancy               Eulalia Haines, NATHEN  2020  09:17 EST

## 2020-11-19 NOTE — PLAN OF CARE
Problem: Adult Inpatient Plan of Care  Goal: Plan of Care Review  Outcome: Ongoing, Progressing  Flowsheets (Taken 11/19/2020 0440)  Plan of Care Reviewed With:   patient   spouse  Outcome Summary: pt vss, resting well. pain controlled with motrin and percocet. plans to discharge in am.   Goal Outcome Evaluation:  Plan of Care Reviewed With: patient, spouse  Progress: improving  Outcome Summary: pt vss, resting well. pain controlled with motrin and percocet. plans to discharge in am.

## 2020-11-20 RX ORDER — OXYCODONE HYDROCHLORIDE AND ACETAMINOPHEN 5; 325 MG/1; MG/1
2 TABLET ORAL EVERY 4 HOURS PRN
Qty: 30 TABLET | Refills: 0 | Status: SHIPPED | OUTPATIENT
Start: 2020-11-20 | End: 2020-11-23

## 2020-11-20 RX ORDER — IBUPROFEN 800 MG/1
800 TABLET ORAL EVERY 8 HOURS PRN
Qty: 60 TABLET | Refills: 0 | Status: SHIPPED | OUTPATIENT
Start: 2020-11-20

## 2020-11-20 RX ADMIN — OXYCODONE HYDROCHLORIDE AND ACETAMINOPHEN 1 TABLET: 10; 325 TABLET ORAL at 16:41

## 2020-11-20 RX ADMIN — OXYCODONE HYDROCHLORIDE AND ACETAMINOPHEN 1 TABLET: 10; 325 TABLET ORAL at 12:42

## 2020-11-20 RX ADMIN — IBUPROFEN 800 MG: 800 TABLET ORAL at 04:32

## 2020-11-20 RX ADMIN — DOCUSATE SODIUM 100 MG: 100 CAPSULE ORAL at 08:06

## 2020-11-20 RX ADMIN — OXYCODONE HYDROCHLORIDE AND ACETAMINOPHEN 1 TABLET: 10; 325 TABLET ORAL at 06:43

## 2020-11-20 RX ADMIN — IBUPROFEN 800 MG: 800 TABLET ORAL at 12:42

## 2020-11-20 RX ADMIN — IBUPROFEN 800 MG: 800 TABLET ORAL at 20:32

## 2020-11-20 RX ADMIN — OXYCODONE HYDROCHLORIDE AND ACETAMINOPHEN 1 TABLET: 10; 325 TABLET ORAL at 20:32

## 2020-11-20 RX ADMIN — OXYCODONE HYDROCHLORIDE AND ACETAMINOPHEN 1 TABLET: 10; 325 TABLET ORAL at 01:27

## 2020-11-20 NOTE — LACTATION NOTE
This note was copied from a baby's chart.  HGP initiated and Mom pumped 20mls. Reviewed feeding plan of nursing first then supplementing with EBM and formula to equal 20 mls per MD. Will call for any assist or questions.

## 2020-11-20 NOTE — LACTATION NOTE
Patient is teary about baby having lost so much weight although she was up 20 grams today. Breasts are filling and milk is easily expressed . LC assisted with deep latch to right breast in football hold and supplied manual breast pump . Baby had a nutritvie suckle and audible swallow but rested frequently. Practiced hand expression and breast compression while infant at breast . Showed parents how infant responded to the breast compression with deeper swallows and renewed interest. Mom pumped and 12cc colostrum was fed back.  Lactation Consult Note    Evaluation Completed: 2020 12:55 EST  Patient Name: Wilda Phelps  :  1990  MRN:  [id number]     REFERRAL  INFORMATION:                          Date of Referral: 20   Person Making Referral: physician  Maternal Reason for Referral: breastfeeding currently  Infant Reason for Referral: low birth weight, sleepy    DELIVERY HISTORY:        Skin to skin initiation date/time: 2020  10:14 AM   Skin to skin end date/time:           MATERNAL ASSESSMENT:  Breast Size Issue: none (20 1244 : Hellen Chapman RN)  Breast Shape: round (20 1244 : Hellen Chapman, RN)  Breast Density: filling (20 1244 : Hellen Chapman, RN)  Areola: elastic (20 1244 : Hellen Chapman, RN)  Nipples: everted (20 1244 : Hellen Chapman, RN)                INFANT ASSESSMENT:  Information for the patient's :  Speedy Phelps [[id number]]   No past medical history on file.     Feeding Readiness Cues: hand to mouth movements, quiet, sucking motion present (20 1200 : Hellen Chapman RN)      Feeding Tolerance/Success: adequate pause for breath, coordinated suck/swallow, intermittent pauses, strong suck (20 1200 : Hellen Chapman, RN)      Satiety Cues: infant releases breast (20 1200 : Hellen Chapman, RN)                        Breastfeeding: breastfeeding, right side only (20 1200 :  Hellen Chapman, RN)   Infant Positioning: clutch/football (20 1200 : Hellen Chapman, RN)      Breastfeeding Time, Right (min): 10 (20 1200 : Hellen Chapman RN)   Effective Latch During Feeding: yes (20 1200 : Hellen Chapman RN)   Suck/Swallow Coordination: present (20 1200 : Hellen Chapman RN)   Signs of Milk Transfer: audible swallow, suck/swallow ratio, transfer present (20 1200 : Hellen Chapman, RN)       Latch: 2-->grasps breast, tongue down, lips flanged, rhythmic sucking (20 1200 : Hellen Chapman, RN)   Audible Swallowin-->spontaneous and intermittent (24 hrs old) (20 1200 : Hellen Chapman, RN)   Type of Nipple: 2-->everted (after stimulation) (20 1200 : Hellen Chapman, RN)   Comfort (Breast/Nipple): 2-->soft/nontender (20 1200 : Hellen Chapman, RN)   Hold (Positioning): 1-->minimal assist, teach one side, mother does other, staff holds (20 1200 : Hellen Chapman, RN)   Latch Score: 9 (20 1200 : Hellen Chapman RN)     Infant-Driven Feeding Scales - Readiness: Alert once handled. Some rooting or takes pacifier. Adequate tone. (20 1200 : Hellen Chapman, RN)               MATERNAL INFANT FEEDING:  Maternal Preparation: hand hygiene (20 1244 : Hellen Chapman, RN)  Maternal Emotional State: receptive, other (see comments)(tearful) (20 1244 : Hellen Chapman, RN)  Infant Positioning: clutch/football (20 1244 : Hellen Chapman, RN)   Signs of Milk Transfer: audible swallow, deep jaw excursions noted, suck/swallow ratio, transfer present (20 : Hellen Chapman, RN)  Pain with Feeding: no (20 : Hellen Chapman, RN)        Comfort Measures Before/During Feeding: suction broken using finger (20 : Hellen Chapman RN)  Milk Ejection Reflex: present (20 : Hellen Chapman RN)           Latch  Assistance: minimal assistance (11/20/20 1244 : Hellen Chapman, RN)                               EQUIPMENT TYPE:  Breast Pump Type: manual pump (11/20/20 1244 : Hellen Chapman, RN)  Breast Pump Flange Type: hard (11/20/20 1244 : Hellen Chapman, RN)  Breast Pump Flange Size: 24 mm (11/20/20 1244 : Hellen Chapman, RN)                        BREAST PUMPING:  Breast Pumping Interventions: post-feed pumping encouraged (11/20/20 1244 : Hellen Chapman, RN)       LACTATION REFERRALS:  Lactation Referrals: outpatient lactation program, support group (11/20/20 1244 : Hellen Chapman, RN)

## 2020-11-20 NOTE — DISCHARGE SUMMARY
Date of Discharge:  2020    Discharge Diagnosis: primary c/s    Presenting Problem/History of Present Illness  Pregnancy [Z34.90]       Hospital Course  Patient is a 30 y.o. female presented for scheduled c/s d/t breech presentation.  Delivered viable female infant per Dr. Julio.  No pp complications.  Ready for d/c today.      Procedures Performed  Procedure(s):   SECTION PRIMARY         Condition on Discharge:  Post-Op Day 3 S/P   Subjective     Patient reports:    Doing well - ready for discharge. Pain well controlled. Tolerating po and   having flatus. Voiding and ambulating without difficulty. Lochia normal.     Vital Signs  Temp:  [97.3 °F (36.3 °C)-98.3 °F (36.8 °C)] 97.4 °F (36.3 °C)  Heart Rate:  [56-65] 56  Resp:  [16] 16  BP: (119-125)/(76-78) 125/76    Physical Exam    Gen Alert and awake   Abdomen Soft, ND,  Fundus firm with minimal tenderness   Incision  Intact, without erythema or exudate   Extremities Calves NT bilaterally     Assessment/Plan ]   Assessment:  POD 3  -  Doing well. Stable for discharge.     Plan:    Instructions reviewed       Consults:   Consults     No orders found from 10/19/2020 to 2020.          Discharge Disposition  Home or Self Care    Discharge Medications     Discharge Medications      New Medications      Instructions Start Date   ibuprofen 800 MG tablet  Commonly known as: ADVIL,MOTRIN   800 mg, Oral, Every 8 Hours PRN      oxyCODONE-acetaminophen 5-325 MG per tablet  Commonly known as: PERCOCET   2 tablets, Oral, Every 4 Hours PRN         Continue These Medications      Instructions Start Date   albuterol sulfate  (90 Base) MCG/ACT inhaler  Commonly known as: PROVENTIL HFA;VENTOLIN HFA;PROAIR HFA   2 puffs, Inhalation, Every 4 Hours PRN      prenatal (CLASSIC) vitamin  tablet  Generic drug: prenatal vitamin   1 tablet, Oral, Daily             The patient has been prescribed a controlled substance.  She has been counseled on the risks  associated with using the medication.   The addictive potential of this medication and alternatives were discussed carefully with this patient and she demonstrated understanding.  A ABDIFATAH report has been obtained and reviewed.    Activity at Discharge: restrictions reviewed    Follow-up Appointments  No future appointments.      Test Results Pending at Discharge       NATHEN Okeefe  11/20/20  09:48 EST

## 2020-11-21 VITALS
HEIGHT: 66 IN | DIASTOLIC BLOOD PRESSURE: 74 MMHG | HEART RATE: 56 BPM | WEIGHT: 163 LBS | SYSTOLIC BLOOD PRESSURE: 131 MMHG | RESPIRATION RATE: 18 BRPM | BODY MASS INDEX: 26.2 KG/M2 | TEMPERATURE: 98.2 F | OXYGEN SATURATION: 96 %

## 2020-11-21 RX ADMIN — IBUPROFEN 800 MG: 800 TABLET ORAL at 05:00

## 2020-11-21 RX ADMIN — OXYCODONE HYDROCHLORIDE AND ACETAMINOPHEN 1 TABLET: 10; 325 TABLET ORAL at 09:10

## 2020-11-21 RX ADMIN — OXYCODONE HYDROCHLORIDE AND ACETAMINOPHEN 1 TABLET: 10; 325 TABLET ORAL at 05:00

## 2020-11-21 NOTE — DISCHARGE SUMMARY
[facility]   PROGRESS NOTE    Patient Name: Wilda Phelps  :  1990  MRN:  [id number]    Post-Op Day 3 S/P   Subjective    Was discharged yesterday but decided to stay b/c baby couldn't go.      Patient reports:    Doing well - ready for discharge. Pain well controlled. Tolerating po and   having flatus. Voiding and ambulating without difficulty. Lochia normal.     Objective       Vitals: Vital Signs Range for the last 24 hours  Temperature: Temp:  [96.7 °F (35.9 °C)-98.2 °F (36.8 °C)] 98.2 °F (36.8 °C)       BP: BP: (118-138)/(74-89) 131/74   Pulse: Heart Rate:  [56-60] 56                                                         Physical  Exam     Gen: Alert and awake                              Assessment/Plan ]   Assessment:  POD 3  -  Doing well. Stable for discharge.     Plan:    Instructions reviewed - no driving for 1 week and no narcotic pain meds.  no heavy lifting for 6  weeks, pelvic rest for 6 weeks.   Follow up in 6 weeks.   Rx on chart.         Pregnancy            Maude Mcclure MD  2020  10:30 EST

## 2020-11-24 ENCOUNTER — TELEPHONE (OUTPATIENT)
Dept: LACTATION | Facility: HOSPITAL | Age: 30
End: 2020-11-24

## 2020-11-24 NOTE — TELEPHONE ENCOUNTER
D/c follow up call: left message   wheezing. Cardiovascular: Negative for chest pain, palpitations and leg swelling. Gastrointestinal: Negative for abdominal distention, abdominal pain, blood in stool, constipation, diarrhea, nausea and vomiting. Endocrine: Negative for polydipsia, polyphagia and polyuria. Genitourinary: Negative for decreased urine volume, difficulty urinating, dysuria, enuresis, flank pain, frequency, genital sores, hematuria and urgency. Musculoskeletal: Negative for back pain, gait problem, joint swelling, neck pain and neck stiffness. Skin: Negative for color change, rash and wound. Allergic/Immunologic: Negative for environmental allergies and immunocompromised state. Neurological: Negative for dizziness, syncope, weakness, light-headedness, numbness and headaches. Psychiatric/Behavioral: Negative for agitation, confusion, dysphoric mood, self-injury, sleep disturbance and suicidal ideas. The patient is not hyperactive. PHYSICAL EXAM:  /88   Pulse 96   Temp 97.5 °F (36.4 °C) (Temporal)   Ht 5' 4\" (1.626 m)   Wt 187 lb (84.8 kg)   BMI 32.10 kg/m²   Physical Exam  Constitutional:       General: He is not in acute distress. Appearance: Normal appearance. He is well-developed. HENT:      Head: Normocephalic and atraumatic. Nose: Nose normal.   Eyes:      General: No scleral icterus. Conjunctiva/sclera: Conjunctivae normal.      Pupils: Pupils are equal, round, and reactive to light. Neck:      Musculoskeletal: Normal range of motion and neck supple. Trachea: No tracheal deviation. Cardiovascular:      Rate and Rhythm: Normal rate and regular rhythm. Pulmonary:      Effort: Pulmonary effort is normal. No respiratory distress. Breath sounds: No stridor. Abdominal:      General: There is no distension. Palpations: Abdomen is soft. There is no mass. Tenderness: There is no abdominal tenderness. Musculoskeletal:         General: No tenderness. little change in the stone. At some point he will need repeat ESWL. To prevent any scarring or damage the kidney will wait to consider this in 6 months.  - POCT Urinalysis no Micro  - XR ABDOMEN (KUB) (SINGLE AP VIEW); Future    2. Renal calculus, left  Some point he would be a candidate for treatment of the left stone      Orders Placed This Encounter   Procedures    XR ABDOMEN (KUB) (SINGLE AP VIEW)     Standing Status:   Future     Standing Expiration Date:   8/17/2021     Order Specific Question:   Reason for exam:     Answer:   bilateral rtenal calculus    POCT Urinalysis no Micro        Return in about 6 months (around 2/17/2021) for office visit after xray study. All information inputted into the note by the MA to include chief complaint, past medical history, past surgical history, medications, allergies, social and family history and review of systems has been reviewed and updated as needed by me. EMR Dragon/transcription disclaimer: Much of this documentt is electronic  transcription/translation of spoken language to printed text. The  electronic translation of spoken language may be erroneous, or at times,  nonsensical words or phrases may be inadvertently transcribed.  Although I  have reviewed the document for such errors, some may still exist.

## 2020-11-25 NOTE — TELEPHONE ENCOUNTER
Mother called [facility] stating that infant has been having difficulty nursing, will latch, but only sucks a couple of times then falls asleep. Mother has been pumping and supplementing with bottles of EBM. Encouraged mother to pump/express before latching, to express while infant latched, and to evaluate for signs of satiety after feeds. Discussed paced bottle feeding and encouraged mother to always attempt at the breast first before giving a bottle. Encouraged [facility] appt/follow up as needed.

## 2020-12-04 ENCOUNTER — TELEPHONE (OUTPATIENT)
Dept: LACTATION | Facility: HOSPITAL | Age: 30
End: 2020-12-04

## 2022-05-29 ENCOUNTER — HOSPITAL ENCOUNTER (EMERGENCY)
Dept: HOSPITAL 79 - ER1 | Age: 32
Discharge: HOME | End: 2022-05-29
Payer: SELF-PAY

## 2022-05-29 DIAGNOSIS — N83.201: Primary | ICD-10-CM

## 2022-05-29 LAB
BUN/CREATININE RATIO: 16 (ref 0–10)
HGB BLD-MCNC: 13.8 GM/DL (ref 12.3–15.3)
RED BLOOD COUNT: 4.18 M/UL (ref 4–5.1)
WHITE BLOOD COUNT: 6.5 K/UL (ref 4.5–11)

## 2023-03-22 LAB
EXTERNAL HEPATITIS B SURFACE ANTIGEN: NEGATIVE
EXTERNAL HEPATITIS C AB: NORMAL
EXTERNAL RUBELLA QUALITATIVE: NORMAL
EXTERNAL SYPHILIS RPR SCREEN: NORMAL
HIV1 P24 AG SERPL QL IA: NORMAL

## 2023-04-21 NOTE — PLAN OF CARE
Problem: Adult Inpatient Plan of Care  Goal: Plan of Care Review  Outcome: Ongoing, Progressing  Flowsheets (Taken 11/17/2020 1151)  Progress: improving  Plan of Care Reviewed With:   patient   spouse  Outcome Summary: pt and baby in rr, stable  Goal: Patient-Specific Goal (Individualized)  Outcome: Ongoing, Progressing  Goal: Absence of Hospital-Acquired Illness or Injury  Outcome: Ongoing, Progressing  Intervention: Prevent and Manage VTE (venous thromboembolism) Risk  Recent Flowsheet Documentation  Taken 11/17/2020 1143 by Viktoria Staples RN  VTE Prevention/Management:   bilateral   sequential compression devices on  Taken 11/17/2020 1130 by Viktoria Staples RN  VTE Prevention/Management:   bilateral   sequential compression devices on  Taken 11/17/2020 1115 by Viktoria Staples RN  VTE Prevention/Management:   bilateral   sequential compression devices on  Taken 11/17/2020 1100 by Viktoria Staples RN  VTE Prevention/Management:   bilateral   sequential compression devices on  Taken 11/17/2020 1045 by Viktoria Staples RN  VTE Prevention/Management:   bilateral   sequential compression devices on  Taken 11/17/2020 1030 by Viktoria Staples RN  VTE Prevention/Management:   bilateral   sequential compression devices on  Taken 11/17/2020 1011 by Viktoria Staples RN  VTE Prevention/Management:   bilateral   sequential compression devices on  Goal: Optimal Comfort and Wellbeing  Outcome: Ongoing, Progressing  Goal: Readiness for Transition of Care  Outcome: Ongoing, Progressing  Intervention: Mutually Develop Transition Plan  Recent Flowsheet Documentation  Taken 11/17/2020 0641 by Viktoria Staples RN  Transportation Concerns: car, none  Taken 11/17/2020 0639 by Viktoria Staples RN  Equipment Currently Used at Home: none  Taken 11/17/2020 0638 by Viktoria Staples RN  Transportation Anticipated: family or friend will provide  Patient/Family Anticipated Services at Transition: none  Patient/Family Anticipates Transition to: home  with family     Problem: Device-Related Complication Risk (Anesthesia/Analgesia, Neuraxial)  Goal: Safe Infusion Delivery Completion  Outcome: Ongoing, Progressing     Problem: Infection (Anesthesia/Analgesia, Neuraxial)  Goal: Absence of Infection Signs and Symptoms  Outcome: Ongoing, Progressing     Problem: Nausea and Vomiting (Anesthesia/Analgesia, Neuraxial)  Goal: Nausea and Vomiting Relief  Outcome: Ongoing, Progressing     Problem: Pain (Anesthesia/Analgesia, Neuraxial)  Goal: Effective Pain Control  Outcome: Ongoing, Progressing  Intervention: Prevent or Manage Pain  Recent Flowsheet Documentation  Taken 11/17/2020 1115 by Viktoria Staples RN  Pain Management Interventions: see MAR     Problem: Respiratory Compromise (Anesthesia/Analgesia, Neuraxial)  Goal: Effective Oxygenation and Ventilation  Outcome: Ongoing, Progressing     Problem: Sensorimotor Impairment (Anesthesia/Analgesia, Neuraxial)  Goal: Baseline Motor Function  Outcome: Ongoing, Progressing     Problem: Urinary Retention (Anesthesia/Analgesia, Neuraxial)  Goal: Effective Urinary Elimination  Outcome: Ongoing, Progressing   Goal Outcome Evaluation:            Adult

## 2023-10-26 ENCOUNTER — ANESTHESIA EVENT (OUTPATIENT)
Dept: LABOR AND DELIVERY | Facility: HOSPITAL | Age: 33
End: 2023-10-26
Payer: COMMERCIAL

## 2023-10-26 ENCOUNTER — HOSPITAL ENCOUNTER (INPATIENT)
Facility: HOSPITAL | Age: 33
LOS: 3 days | Discharge: HOME | End: 2023-10-29
Attending: OBSTETRICS & GYNECOLOGY | Admitting: OBSTETRICS & GYNECOLOGY
Payer: COMMERCIAL

## 2023-10-26 ENCOUNTER — ANESTHESIA (OUTPATIENT)
Dept: LABOR AND DELIVERY | Facility: HOSPITAL | Age: 33
End: 2023-10-26
Payer: COMMERCIAL

## 2023-10-26 LAB
ABO GROUP BLD: NORMAL
ABO GROUP BLD: NORMAL
ALBUMIN SERPL-MCNC: 3.7 G/DL (ref 3.5–5.2)
ALBUMIN/GLOB SERPL: 1.4 G/DL
ALP SERPL-CCNC: 188 U/L (ref 39–117)
ALT SERPL W P-5'-P-CCNC: 11 U/L (ref 1–33)
ANION GAP SERPL CALCULATED.3IONS-SCNC: 10 MMOL/L (ref 5–15)
AST SERPL-CCNC: 18 U/L (ref 1–32)
BASOPHILS # BLD AUTO: 0.07 10*3/MM3 (ref 0–0.2)
BASOPHILS NFR BLD AUTO: 0.5 % (ref 0–1.5)
BILIRUB SERPL-MCNC: 0.3 MG/DL (ref 0–1.2)
BLD GP AB SCN SERPL QL: POSITIVE
BUN SERPL-MCNC: 8 MG/DL (ref 6–20)
BUN/CREAT SERPL: 13.1 (ref 7–25)
CALCIUM SPEC-SCNC: 10.8 MG/DL (ref 8.6–10.5)
CHLORIDE SERPL-SCNC: 107 MMOL/L (ref 98–107)
CO2 SERPL-SCNC: 20 MMOL/L (ref 22–29)
CREAT SERPL-MCNC: 0.61 MG/DL (ref 0.57–1)
DEPRECATED RDW RBC AUTO: 41.2 FL (ref 37–54)
EGFRCR SERPLBLD CKD-EPI 2021: 121.2 ML/MIN/1.73
EOSINOPHIL # BLD AUTO: 0.32 10*3/MM3 (ref 0–0.4)
EOSINOPHIL NFR BLD AUTO: 2.4 % (ref 0.3–6.2)
ERYTHROCYTE [DISTWIDTH] IN BLOOD BY AUTOMATED COUNT: 12.2 % (ref 12.3–15.4)
FETAL BLEED: NEGATIVE
GLOBULIN UR ELPH-MCNC: 2.6 GM/DL
GLUCOSE SERPL-MCNC: 81 MG/DL (ref 65–99)
HCT VFR BLD AUTO: 37.8 % (ref 34–46.6)
HGB BLD-MCNC: 12.7 G/DL (ref 12–15.9)
IMM GRANULOCYTES # BLD AUTO: 0.1 10*3/MM3 (ref 0–0.05)
IMM GRANULOCYTES NFR BLD AUTO: 0.7 % (ref 0–0.5)
LYMPHOCYTES # BLD AUTO: 4.09 10*3/MM3 (ref 0.7–3.1)
LYMPHOCYTES NFR BLD AUTO: 30.3 % (ref 19.6–45.3)
MCH RBC QN AUTO: 31 PG (ref 26.6–33)
MCHC RBC AUTO-ENTMCNC: 33.6 G/DL (ref 31.5–35.7)
MCV RBC AUTO: 92.2 FL (ref 79–97)
MONOCYTES # BLD AUTO: 0.91 10*3/MM3 (ref 0.1–0.9)
MONOCYTES NFR BLD AUTO: 6.7 % (ref 5–12)
NEUTROPHILS NFR BLD AUTO: 59.4 % (ref 42.7–76)
NEUTROPHILS NFR BLD AUTO: 8.02 10*3/MM3 (ref 1.7–7)
NRBC BLD AUTO-RTO: 0 /100 WBC (ref 0–0.2)
NUMBER OF DOSES: NORMAL
PLATELET # BLD AUTO: 264 10*3/MM3 (ref 140–450)
PMV BLD AUTO: 10.9 FL (ref 6–12)
POTASSIUM SERPL-SCNC: 3.8 MMOL/L (ref 3.5–5.2)
PROT SERPL-MCNC: 6.3 G/DL (ref 6–8.5)
RBC # BLD AUTO: 4.1 10*6/MM3 (ref 3.77–5.28)
RESIDUAL RHIG DETECTED: NORMAL
RH BLD: NEGATIVE
RH BLD: NEGATIVE
SODIUM SERPL-SCNC: 137 MMOL/L (ref 136–145)
T&S EXPIRATION DATE: NORMAL
WBC NRBC COR # BLD: 13.51 10*3/MM3 (ref 3.4–10.8)

## 2023-10-26 PROCEDURE — 86900 BLOOD TYPING SEROLOGIC ABO: CPT | Performed by: OBSTETRICS & GYNECOLOGY

## 2023-10-26 PROCEDURE — 86870 RBC ANTIBODY IDENTIFICATION: CPT | Performed by: OBSTETRICS & GYNECOLOGY

## 2023-10-26 PROCEDURE — 25010000002 KETOROLAC TROMETHAMINE PER 15 MG: Performed by: OBSTETRICS & GYNECOLOGY

## 2023-10-26 PROCEDURE — 25010000002 MORPHINE PER 10 MG: Performed by: STUDENT IN AN ORGANIZED HEALTH CARE EDUCATION/TRAINING PROGRAM

## 2023-10-26 PROCEDURE — 25810000003 LACTATED RINGERS SOLUTION: Performed by: OBSTETRICS & GYNECOLOGY

## 2023-10-26 PROCEDURE — 85025 COMPLETE CBC W/AUTO DIFF WBC: CPT | Performed by: OBSTETRICS & GYNECOLOGY

## 2023-10-26 PROCEDURE — 25010000002 ONDANSETRON PER 1 MG: Performed by: STUDENT IN AN ORGANIZED HEALTH CARE EDUCATION/TRAINING PROGRAM

## 2023-10-26 PROCEDURE — 25010000002 BUPIVACAINE PF 0.75 % SOLUTION: Performed by: STUDENT IN AN ORGANIZED HEALTH CARE EDUCATION/TRAINING PROGRAM

## 2023-10-26 PROCEDURE — 25810000003 LACTATED RINGERS PER 1000 ML: Performed by: OBSTETRICS & GYNECOLOGY

## 2023-10-26 PROCEDURE — 86901 BLOOD TYPING SEROLOGIC RH(D): CPT | Performed by: OBSTETRICS & GYNECOLOGY

## 2023-10-26 PROCEDURE — 80053 COMPREHEN METABOLIC PANEL: CPT | Performed by: OBSTETRICS & GYNECOLOGY

## 2023-10-26 PROCEDURE — 86850 RBC ANTIBODY SCREEN: CPT | Performed by: OBSTETRICS & GYNECOLOGY

## 2023-10-26 PROCEDURE — 25010000002 PHENYLEPHRINE 10 MG/ML SOLUTION: Performed by: NURSE ANESTHETIST, CERTIFIED REGISTERED

## 2023-10-26 PROCEDURE — 25010000002 RHO D IMMUNE GLOBULIN 1500 UNIT/2ML SOLUTION PREFILLED SYRINGE: Performed by: OBSTETRICS & GYNECOLOGY

## 2023-10-26 PROCEDURE — 25010000002 CEFAZOLIN IN DEXTROSE 2-4 GM/100ML-% SOLUTION: Performed by: OBSTETRICS & GYNECOLOGY

## 2023-10-26 PROCEDURE — 85461 HEMOGLOBIN FETAL: CPT | Performed by: OBSTETRICS & GYNECOLOGY

## 2023-10-26 RX ORDER — PROMETHAZINE HYDROCHLORIDE 12.5 MG/1
12.5 SUPPOSITORY RECTAL EVERY 6 HOURS PRN
Status: DISCONTINUED | OUTPATIENT
Start: 2023-10-26 | End: 2023-10-29 | Stop reason: HOSPADM

## 2023-10-26 RX ORDER — OXYTOCIN/0.9 % SODIUM CHLORIDE 30/500 ML
999 PLASTIC BAG, INJECTION (ML) INTRAVENOUS ONCE
Status: COMPLETED | OUTPATIENT
Start: 2023-10-26 | End: 2023-10-26

## 2023-10-26 RX ORDER — KETOROLAC TROMETHAMINE 15 MG/ML
15 INJECTION, SOLUTION INTRAMUSCULAR; INTRAVENOUS EVERY 6 HOURS
Status: COMPLETED | OUTPATIENT
Start: 2023-10-26 | End: 2023-10-27

## 2023-10-26 RX ORDER — DOCUSATE SODIUM 100 MG/1
100 CAPSULE, LIQUID FILLED ORAL 2 TIMES DAILY PRN
Status: DISCONTINUED | OUTPATIENT
Start: 2023-10-26 | End: 2023-10-29 | Stop reason: HOSPADM

## 2023-10-26 RX ORDER — IBUPROFEN 600 MG/1
600 TABLET ORAL EVERY 6 HOURS
Status: DISCONTINUED | OUTPATIENT
Start: 2023-10-27 | End: 2023-10-29 | Stop reason: HOSPADM

## 2023-10-26 RX ORDER — TRANEXAMIC ACID 10 MG/ML
1000 INJECTION, SOLUTION INTRAVENOUS ONCE AS NEEDED
Status: DISCONTINUED | OUTPATIENT
Start: 2023-10-26 | End: 2023-10-26 | Stop reason: HOSPADM

## 2023-10-26 RX ORDER — DROPERIDOL 2.5 MG/ML
0.62 INJECTION, SOLUTION INTRAMUSCULAR; INTRAVENOUS
Status: DISCONTINUED | OUTPATIENT
Start: 2023-10-26 | End: 2023-10-29 | Stop reason: HOSPADM

## 2023-10-26 RX ORDER — DIPHENHYDRAMINE HYDROCHLORIDE 50 MG/ML
25 INJECTION INTRAMUSCULAR; INTRAVENOUS EVERY 4 HOURS PRN
Status: DISCONTINUED | OUTPATIENT
Start: 2023-10-26 | End: 2023-10-29 | Stop reason: HOSPADM

## 2023-10-26 RX ORDER — KETOROLAC TROMETHAMINE 30 MG/ML
30 INJECTION, SOLUTION INTRAMUSCULAR; INTRAVENOUS ONCE
Status: COMPLETED | OUTPATIENT
Start: 2023-10-26 | End: 2023-10-26

## 2023-10-26 RX ORDER — HYDROCODONE BITARTRATE AND ACETAMINOPHEN 5; 325 MG/1; MG/1
1 TABLET ORAL
COMMUNITY
End: 2023-10-29

## 2023-10-26 RX ORDER — HYDROXYZINE 50 MG/1
50 TABLET, FILM COATED ORAL EVERY 6 HOURS PRN
Status: DISCONTINUED | OUTPATIENT
Start: 2023-10-26 | End: 2023-10-29 | Stop reason: HOSPADM

## 2023-10-26 RX ORDER — DIPHENHYDRAMINE HCL 25 MG
25 CAPSULE ORAL EVERY 4 HOURS PRN
Status: DISCONTINUED | OUTPATIENT
Start: 2023-10-26 | End: 2023-10-29 | Stop reason: HOSPADM

## 2023-10-26 RX ORDER — EPHEDRINE SULFATE 50 MG/ML
INJECTION INTRAVENOUS AS NEEDED
Status: DISCONTINUED | OUTPATIENT
Start: 2023-10-26 | End: 2023-10-26 | Stop reason: SURG

## 2023-10-26 RX ORDER — MISOPROSTOL 200 UG/1
800 TABLET ORAL ONCE AS NEEDED
Status: DISCONTINUED | OUTPATIENT
Start: 2023-10-26 | End: 2023-10-26 | Stop reason: HOSPADM

## 2023-10-26 RX ORDER — OXYTOCIN/0.9 % SODIUM CHLORIDE 30/500 ML
125 PLASTIC BAG, INJECTION (ML) INTRAVENOUS CONTINUOUS PRN
Status: COMPLETED | OUTPATIENT
Start: 2023-10-26 | End: 2023-10-26

## 2023-10-26 RX ORDER — ACETAMINOPHEN 500 MG
1000 TABLET ORAL ONCE
Status: COMPLETED | OUTPATIENT
Start: 2023-10-26 | End: 2023-10-26

## 2023-10-26 RX ORDER — CEFAZOLIN SODIUM 2 G/100ML
2000 INJECTION, SOLUTION INTRAVENOUS ONCE
Status: COMPLETED | OUTPATIENT
Start: 2023-10-26 | End: 2023-10-26

## 2023-10-26 RX ORDER — LIDOCAINE HYDROCHLORIDE 10 MG/ML
0.5 INJECTION, SOLUTION INFILTRATION; PERINEURAL ONCE AS NEEDED
Status: DISCONTINUED | OUTPATIENT
Start: 2023-10-26 | End: 2023-10-26 | Stop reason: HOSPADM

## 2023-10-26 RX ORDER — ACETAMINOPHEN 500 MG
1000 TABLET ORAL EVERY 6 HOURS
Status: COMPLETED | OUTPATIENT
Start: 2023-10-26 | End: 2023-10-27

## 2023-10-26 RX ORDER — HYDROCORTISONE 25 MG/G
CREAM TOPICAL 3 TIMES DAILY PRN
Status: DISCONTINUED | OUTPATIENT
Start: 2023-10-26 | End: 2023-10-29 | Stop reason: HOSPADM

## 2023-10-26 RX ORDER — OXYCODONE HYDROCHLORIDE 5 MG/1
5 TABLET ORAL EVERY 4 HOURS PRN
Status: DISCONTINUED | OUTPATIENT
Start: 2023-10-26 | End: 2023-10-29 | Stop reason: HOSPADM

## 2023-10-26 RX ORDER — OXYTOCIN/0.9 % SODIUM CHLORIDE 30/500 ML
125 PLASTIC BAG, INJECTION (ML) INTRAVENOUS CONTINUOUS PRN
Status: CANCELLED | OUTPATIENT
Start: 2023-10-26

## 2023-10-26 RX ORDER — SIMETHICONE 80 MG
80 TABLET,CHEWABLE ORAL 4 TIMES DAILY PRN
Status: DISCONTINUED | OUTPATIENT
Start: 2023-10-26 | End: 2023-10-29 | Stop reason: HOSPADM

## 2023-10-26 RX ORDER — SODIUM CHLORIDE, SODIUM LACTATE, POTASSIUM CHLORIDE, CALCIUM CHLORIDE 600; 310; 30; 20 MG/100ML; MG/100ML; MG/100ML; MG/100ML
125 INJECTION, SOLUTION INTRAVENOUS CONTINUOUS
Status: DISCONTINUED | OUTPATIENT
Start: 2023-10-26 | End: 2023-10-26

## 2023-10-26 RX ORDER — SODIUM CHLORIDE 0.9 % (FLUSH) 0.9 %
10 SYRINGE (ML) INJECTION AS NEEDED
Status: DISCONTINUED | OUTPATIENT
Start: 2023-10-26 | End: 2023-10-26 | Stop reason: HOSPADM

## 2023-10-26 RX ORDER — BUPIVACAINE HYDROCHLORIDE 7.5 MG/ML
INJECTION, SOLUTION EPIDURAL; RETROBULBAR
Status: COMPLETED | OUTPATIENT
Start: 2023-10-26 | End: 2023-10-26

## 2023-10-26 RX ORDER — ONDANSETRON 4 MG/1
4 TABLET, FILM COATED ORAL EVERY 8 HOURS PRN
Status: DISCONTINUED | OUTPATIENT
Start: 2023-10-26 | End: 2023-10-29 | Stop reason: HOSPADM

## 2023-10-26 RX ORDER — SODIUM CHLORIDE 0.9 % (FLUSH) 0.9 %
3 SYRINGE (ML) INJECTION EVERY 12 HOURS SCHEDULED
Status: DISCONTINUED | OUTPATIENT
Start: 2023-10-26 | End: 2023-10-29 | Stop reason: HOSPADM

## 2023-10-26 RX ORDER — PHENYLEPHRINE HYDROCHLORIDE 10 MG/ML
INJECTION INTRAVENOUS AS NEEDED
Status: DISCONTINUED | OUTPATIENT
Start: 2023-10-26 | End: 2023-10-26 | Stop reason: SURG

## 2023-10-26 RX ORDER — PROMETHAZINE HYDROCHLORIDE 25 MG/1
25 TABLET ORAL EVERY 6 HOURS PRN
Status: DISCONTINUED | OUTPATIENT
Start: 2023-10-26 | End: 2023-10-29 | Stop reason: HOSPADM

## 2023-10-26 RX ORDER — ONDANSETRON 2 MG/ML
4 INJECTION INTRAMUSCULAR; INTRAVENOUS ONCE AS NEEDED
Status: DISCONTINUED | OUTPATIENT
Start: 2023-10-26 | End: 2023-10-29 | Stop reason: HOSPADM

## 2023-10-26 RX ORDER — ACETAMINOPHEN 325 MG/1
650 TABLET ORAL EVERY 6 HOURS
Status: DISCONTINUED | OUTPATIENT
Start: 2023-10-27 | End: 2023-10-29 | Stop reason: HOSPADM

## 2023-10-26 RX ORDER — ONDANSETRON 2 MG/ML
4 INJECTION INTRAMUSCULAR; INTRAVENOUS EVERY 8 HOURS PRN
Status: DISCONTINUED | OUTPATIENT
Start: 2023-10-26 | End: 2023-10-29 | Stop reason: HOSPADM

## 2023-10-26 RX ORDER — MORPHINE SULFATE 2 MG/ML
2 INJECTION, SOLUTION INTRAMUSCULAR; INTRAVENOUS
Status: ACTIVE | OUTPATIENT
Start: 2023-10-26 | End: 2023-10-26

## 2023-10-26 RX ORDER — CITRIC ACID/SODIUM CITRATE 334-500MG
30 SOLUTION, ORAL ORAL ONCE
Status: COMPLETED | OUTPATIENT
Start: 2023-10-26 | End: 2023-10-26

## 2023-10-26 RX ORDER — HYDROMORPHONE HYDROCHLORIDE 1 MG/ML
0.5 INJECTION, SOLUTION INTRAMUSCULAR; INTRAVENOUS; SUBCUTANEOUS
Status: DISCONTINUED | OUTPATIENT
Start: 2023-10-26 | End: 2023-10-26 | Stop reason: HOSPADM

## 2023-10-26 RX ORDER — OXYTOCIN/0.9 % SODIUM CHLORIDE 30/500 ML
250 PLASTIC BAG, INJECTION (ML) INTRAVENOUS CONTINUOUS
Status: DISPENSED | OUTPATIENT
Start: 2023-10-26 | End: 2023-10-26

## 2023-10-26 RX ORDER — METHYLERGONOVINE MALEATE 0.2 MG/ML
200 INJECTION INTRAVENOUS ONCE AS NEEDED
Status: DISCONTINUED | OUTPATIENT
Start: 2023-10-26 | End: 2023-10-26 | Stop reason: HOSPADM

## 2023-10-26 RX ORDER — ONDANSETRON 2 MG/ML
4 INJECTION INTRAMUSCULAR; INTRAVENOUS ONCE AS NEEDED
Status: COMPLETED | OUTPATIENT
Start: 2023-10-26 | End: 2023-10-26

## 2023-10-26 RX ORDER — FAMOTIDINE 10 MG/ML
20 INJECTION, SOLUTION INTRAVENOUS ONCE AS NEEDED
Status: COMPLETED | OUTPATIENT
Start: 2023-10-26 | End: 2023-10-26

## 2023-10-26 RX ORDER — SODIUM CHLORIDE 0.9 % (FLUSH) 0.9 %
3-10 SYRINGE (ML) INJECTION AS NEEDED
Status: DISCONTINUED | OUTPATIENT
Start: 2023-10-26 | End: 2023-10-29 | Stop reason: HOSPADM

## 2023-10-26 RX ORDER — NALOXONE HCL 0.4 MG/ML
0.2 VIAL (ML) INJECTION
Status: CANCELLED | OUTPATIENT
Start: 2023-10-26

## 2023-10-26 RX ORDER — SODIUM CHLORIDE 9 MG/ML
40 INJECTION, SOLUTION INTRAVENOUS AS NEEDED
Status: DISCONTINUED | OUTPATIENT
Start: 2023-10-26 | End: 2023-10-29 | Stop reason: HOSPADM

## 2023-10-26 RX ORDER — OXYCODONE HYDROCHLORIDE 10 MG/1
10 TABLET ORAL EVERY 4 HOURS PRN
Status: DISCONTINUED | OUTPATIENT
Start: 2023-10-26 | End: 2023-10-29 | Stop reason: HOSPADM

## 2023-10-26 RX ORDER — SODIUM CHLORIDE 0.9 % (FLUSH) 0.9 %
10 SYRINGE (ML) INJECTION EVERY 12 HOURS SCHEDULED
Status: DISCONTINUED | OUTPATIENT
Start: 2023-10-26 | End: 2023-10-26 | Stop reason: HOSPADM

## 2023-10-26 RX ORDER — MORPHINE SULFATE 4 MG/ML
INJECTION, SOLUTION INTRAMUSCULAR; INTRAVENOUS
Status: COMPLETED | OUTPATIENT
Start: 2023-10-26 | End: 2023-10-26

## 2023-10-26 RX ORDER — SODIUM CHLORIDE 9 MG/ML
40 INJECTION, SOLUTION INTRAVENOUS AS NEEDED
Status: DISCONTINUED | OUTPATIENT
Start: 2023-10-26 | End: 2023-10-26 | Stop reason: HOSPADM

## 2023-10-26 RX ORDER — CARBOPROST TROMETHAMINE 250 UG/ML
250 INJECTION, SOLUTION INTRAMUSCULAR
Status: DISCONTINUED | OUTPATIENT
Start: 2023-10-26 | End: 2023-10-26 | Stop reason: HOSPADM

## 2023-10-26 RX ADMIN — DOCUSATE SODIUM 100 MG: 100 CAPSULE, LIQUID FILLED ORAL at 21:40

## 2023-10-26 RX ADMIN — HUMAN RHO(D) IMMUNE GLOBULIN 1500 UNITS: 1500 SOLUTION INTRAMUSCULAR; INTRAVENOUS at 13:35

## 2023-10-26 RX ADMIN — BUPIVACAINE HYDROCHLORIDE 1.6 ML: 7.5 INJECTION, SOLUTION EPIDURAL; RETROBULBAR at 07:30

## 2023-10-26 RX ADMIN — ONDANSETRON 4 MG: 2 INJECTION INTRAMUSCULAR; INTRAVENOUS at 07:03

## 2023-10-26 RX ADMIN — KETOROLAC TROMETHAMINE 15 MG: 15 INJECTION, SOLUTION INTRAMUSCULAR; INTRAVENOUS at 15:53

## 2023-10-26 RX ADMIN — OXYCODONE HYDROCHLORIDE 5 MG: 5 TABLET ORAL at 11:08

## 2023-10-26 RX ADMIN — ACETAMINOPHEN 1000 MG: 500 TABLET ORAL at 12:29

## 2023-10-26 RX ADMIN — PHENYLEPHRINE HYDROCHLORIDE 100 MCG: 10 INJECTION INTRAVENOUS at 07:42

## 2023-10-26 RX ADMIN — ACETAMINOPHEN 1000 MG: 500 TABLET ORAL at 18:20

## 2023-10-26 RX ADMIN — KETOROLAC TROMETHAMINE 30 MG: 30 INJECTION, SOLUTION INTRAMUSCULAR; INTRAVENOUS at 09:05

## 2023-10-26 RX ADMIN — MORPHINE SULFATE 200 MCG: 4 INJECTION, SOLUTION INTRAMUSCULAR; INTRAVENOUS at 07:30

## 2023-10-26 RX ADMIN — Medication 125 ML/HR: at 09:50

## 2023-10-26 RX ADMIN — EPHEDRINE SULFATE 10 MG: 50 INJECTION INTRAVENOUS at 07:51

## 2023-10-26 RX ADMIN — FAMOTIDINE 20 MG: 10 INJECTION, SOLUTION INTRAVENOUS at 07:04

## 2023-10-26 RX ADMIN — ACETAMINOPHEN 1000 MG: 500 TABLET ORAL at 06:23

## 2023-10-26 RX ADMIN — SODIUM CHLORIDE, POTASSIUM CHLORIDE, SODIUM LACTATE AND CALCIUM CHLORIDE 125 ML/HR: 600; 310; 30; 20 INJECTION, SOLUTION INTRAVENOUS at 05:35

## 2023-10-26 RX ADMIN — PHENYLEPHRINE HYDROCHLORIDE 100 MCG: 10 INJECTION INTRAVENOUS at 07:34

## 2023-10-26 RX ADMIN — SODIUM CHLORIDE, POTASSIUM CHLORIDE, SODIUM LACTATE AND CALCIUM CHLORIDE 1000 ML: 600; 310; 30; 20 INJECTION, SOLUTION INTRAVENOUS at 06:56

## 2023-10-26 RX ADMIN — KETOROLAC TROMETHAMINE 15 MG: 15 INJECTION, SOLUTION INTRAMUSCULAR; INTRAVENOUS at 21:40

## 2023-10-26 RX ADMIN — SODIUM CHLORIDE, POTASSIUM CHLORIDE, SODIUM LACTATE AND CALCIUM CHLORIDE: 600; 310; 30; 20 INJECTION, SOLUTION INTRAVENOUS at 07:59

## 2023-10-26 RX ADMIN — CEFAZOLIN SODIUM 2000 MG: 2 INJECTION, SOLUTION INTRAVENOUS at 07:26

## 2023-10-26 RX ADMIN — Medication 999 ML/HR: at 07:53

## 2023-10-26 RX ADMIN — SODIUM CITRATE AND CITRIC ACID MONOHYDRATE 30 ML: 500; 334 SOLUTION ORAL at 07:25

## 2023-10-26 NOTE — ANESTHESIA PREPROCEDURE EVALUATION
Anesthesia Evaluation     Patient summary reviewed and Nursing notes reviewed   no history of anesthetic complications:                Airway   Mallampati: I  TM distance: >3 FB  Neck ROM: full  Dental - normal exam     Pulmonary - negative pulmonary ROS   Cardiovascular - negative cardio ROS        Neuro/Psych- negative ROS  GI/Hepatic/Renal/Endo      Musculoskeletal (-) negative ROS    Abdominal    Substance History      OB/GYN    (+) Pregnant        Other                    Anesthesia Plan    ASA 2     spinal     (39w0d    I have reviewed the patient's history with the patient and the chart, including all pertinent laboratory results and imaging. Risks of neuraxial anesthesia were discussed with patient including but not limited to: inadequate block, bleeding, infection, persistent numbness or weakness, nerve damage, painful dysesthesia and spinal headache.    rCS; prior CS for breech presentation)    Anesthetic plan, risks, benefits, and alternatives have been provided, discussed and informed consent has been obtained with: patient and spouse/significant other.    Plan discussed with CRNA.    CODE STATUS:    Level Of Support Discussed With: Patient  Code Status (Patient has no pulse and is not breathing): CPR (Attempt to Resuscitate)  Medical Interventions (Patient has pulse or is breathing): Full Support

## 2023-10-26 NOTE — ANESTHESIA PROCEDURE NOTES
Spinal Block      Patient location during procedure: OR  Performed By  Anesthesiologist: Kye Raza MD  CRNA/CAA: eYssenia Eastman CRNA  Preanesthetic Checklist  Completed: patient identified, IV checked, site marked, risks and benefits discussed, surgical consent, monitors and equipment checked, pre-op evaluation and timeout performed  Spinal Block Prep:  Patient Position:sitting  Sterile Tech:sterile barriers, mask and gloves  Prep:Chloraprep  Patient Monitoring:blood pressure monitoring and continuous pulse oximetry    Spinal Block Procedure  Approach:midline  Guidance:landmark technique  Location:L4-L5  Needle Type:Michelle  Needle Gauge:25 G  Placement of Spinal needle event:cerebrospinal fluid aspirated  Paresthesia: noMedications: Morphine sulfate (PF) injection - Spinal   200 mcg - 10/26/2023 7:30:00 AM  bupivacaine PF (MARCAINE) 0.75 % injection - Spinal   1.6 mL - 10/26/2023 7:30:00 AM   Post Assessment  Patient Tolerance:patient tolerated the procedure well with no apparent complications  Complications no

## 2023-10-26 NOTE — H&P
[facility]  Obstetric Preop History and Physical:    Patient Name: Wilda Kwon  :  1990  MRN:  [id number]      Chief Complaint   Patient presents with    Scheduled        Subjective                33 y.o. female  currently at 39w0d, who presents for repeat ltcd.  Her pnc has been uncomplicated.               Past OB History:       OB History    Para Term  AB Living   2 1 1 0 0 1   SAB IAB Ectopic Molar Multiple Live Births   0 0 0 0 0 1      # Outcome Date GA Lbr Zac/2nd Weight Sex Delivery Anes PTL Lv   2 Current            1 Term 20 39w0d  2565 g (5 lb 10.5 oz) F CS-LTranv Spinal N YESENIA      Birth Comments: panda 1 or 1      Name: TIERRA KWON      Apgar1: 8  Apgar5: 9       Past Medical History: Past Medical History:   Diagnosis Date    Abnormal Pap smear of cervix     Asthma       Past Surgical History Past Surgical History:   Procedure Laterality Date    ANKLE ARTHROSCOPY Right ,      SECTION N/A 2020    Procedure:  SECTION PRIMARY;  Surgeon: Whit Julio MD;  Location: [facility] LABOR DELIVERY;  Service: Obstetrics/Gynecology;  Laterality: N/A;      Family History: History reviewed. No pertinent family history.   Social History:  reports that she has never smoked. She has never used smokeless tobacco.   reports that she does not currently use alcohol.   reports no history of drug use.    Allergies:     Patient has no known allergies.     Objective       Vital Signs Range for the last 24 hours  Temperature: Temp:  [98 °F (36.7 °C)] 98 °F (36.7 °C)   Temp Source: Temp src: Oral   BP:     Pulse:     Respirations:     SPO2:        lactated ringers, 125 mL/hr, Last Rate: 125 mL/hr (10/26/23 0728)  oxytocin, 250 mL/hr                       Physical Exam:  General: Alert in NAD   Heart Normal rate and regular rhythm   Lungs Clear to auscultation bilaterally     Abdomen Gravid, soft, no masses   Extremities  edema         Cervix:  Exam by:     Dilation:     Effacement:     Station:                               FHR:   TOCO:            Assessment & Plan     Assessment: 39wks for repeat ltcd    Plan: Recommend proceeding with  section for delivery. Indications for proceeding, risks and benefits have been discussed with patient and. All questions answered.         Whit Julio MD  10/26/2023  07:33 EDT

## 2023-10-26 NOTE — PLAN OF CARE
Problem: Adult Inpatient Plan of Care  Goal: Plan of Care Review  Outcome: Ongoing, Progressing  Flowsheets (Taken 10/26/2023 0858)  Progress: improving  Plan of Care Reviewed With: patient  Outcome Evaluation: Repeat  section. Pt has scant bleeding and uterus is firm, midline, and 2 cm belwo umbilicus. Blood loss was 166 mL. Pt is stable and ok to continue POC.  Goal: Patient-Specific Goal (Individualized)  Outcome: Ongoing, Progressing  Goal: Absence of Hospital-Acquired Illness or Injury  Outcome: Ongoing, Progressing  Intervention: Identify and Manage Fall Risk  Recent Flowsheet Documentation  Taken 10/26/2023 0845 by Cris Traylor RN  Safety Promotion/Fall Prevention: safety round/check completed  Taken 10/26/2023 0610 by Cris Traylor RN  Safety Promotion/Fall Prevention: safety round/check completed  Intervention: Prevent and Manage VTE (Venous Thromboembolism) Risk  Recent Flowsheet Documentation  Taken 10/26/2023 0845 by Cris Traylor RN  Activity Management: bedrest  VTE Prevention/Management:   bilateral   sequential compression devices on  Taken 10/26/2023 0830 by Cris Traylor RN  Activity Management: bedrest  VTE Prevention/Management:   bilateral   sequential compression devices on  Taken 10/26/2023 0610 by Cris Traylor RN  VTE Prevention/Management: sequential compression devices off  Goal: Optimal Comfort and Wellbeing  Outcome: Ongoing, Progressing  Intervention: Provide Person-Centered Care  Recent Flowsheet Documentation  Taken 10/26/2023 0830 by Cris Traylor RN  Trust Relationship/Rapport:   thoughts/feelings acknowledged   reassurance provided   questions encouraged   questions answered   empathic listening provided   emotional support provided   choices provided   care explained  Taken 10/26/2023 0610 by Cris Traylor RN  Trust Relationship/Rapport:   care explained   choices provided   emotional support provided   empathic listening provided   questions answered    questions encouraged   reassurance provided   thoughts/feelings acknowledged  Goal: Readiness for Transition of Care  Outcome: Ongoing, Progressing     Problem: Pain (Anesthesia/Analgesia, Neuraxial)  Goal: Effective Pain Control  Outcome: Ongoing, Progressing     Problem: Respiratory Compromise (Anesthesia/Analgesia, Neuraxial)  Goal: Effective Oxygenation and Ventilation  Outcome: Ongoing, Progressing     Problem: Skin Injury Risk Increased  Goal: Skin Health and Integrity  Outcome: Ongoing, Progressing   Goal Outcome Evaluation:  Plan of Care Reviewed With: patient        Progress: improving  Outcome Evaluation: Repeat  section. Pt has scant bleeding and uterus is firm, midline, and 2 cm belwo umbilicus. Blood loss was 166 mL. Pt is stable and ok to continue POC.

## 2023-10-26 NOTE — PLAN OF CARE
Goal Outcome Evaluation:              Outcome Evaluation: VSS. F/C in place. Ambulated to bathroom. Pain controlled.

## 2023-10-26 NOTE — ANESTHESIA POSTPROCEDURE EVALUATION
"Patient: Wilda Phelps    Procedure Summary       Date: 10/26/23 Room / Location:  [facility]    Anesthesia Start: 728 Anesthesia Stop: 822    Procedure:  SECTION REPEAT (Abdomen) Diagnosis:     Surgeons: Whit Julio MD Provider: Kye Raza MD    Anesthesia Type: spinal ASA Status: 2            Anesthesia Type: spinal    Vitals  Vitals Value Taken Time   /56 10/26/23 1000   Temp 36.3 °C (97.4 °F) 10/26/23 1000   Pulse 48 10/26/23 1009   Resp 16 10/26/23 1000   SpO2 96 % 10/26/23 1009   Vitals shown include unfiled device data.        Post Anesthesia Care and Evaluation    Patient location during evaluation: PACU  Patient participation: complete - patient participated  Level of consciousness: awake and alert  Pain management: adequate    Airway patency: patent  Anesthetic complications: No anesthetic complications  PONV Status: controlled  Cardiovascular status: acceptable and hemodynamically stable  Respiratory status: acceptable  Hydration status: acceptable    Comments: /56   Pulse 50   Temp 36.3 °C (97.4 °F) (Oral)   Resp 16   Ht 167.6 cm (66\")   Wt 72.4 kg (159 lb 9.6 oz)   SpO2 94%   Breastfeeding Unknown   BMI 25.76 kg/m²     "

## 2023-10-27 LAB
BASOPHILS # BLD AUTO: 0.05 10*3/MM3 (ref 0–0.2)
BASOPHILS NFR BLD AUTO: 0.4 % (ref 0–1.5)
DEPRECATED RDW RBC AUTO: 40.5 FL (ref 37–54)
EOSINOPHIL # BLD AUTO: 0.42 10*3/MM3 (ref 0–0.4)
EOSINOPHIL NFR BLD AUTO: 3.3 % (ref 0.3–6.2)
ERYTHROCYTE [DISTWIDTH] IN BLOOD BY AUTOMATED COUNT: 11.9 % (ref 12.3–15.4)
HCT VFR BLD AUTO: 34.9 % (ref 34–46.6)
HGB BLD-MCNC: 11.8 G/DL (ref 12–15.9)
IMM GRANULOCYTES # BLD AUTO: 0.05 10*3/MM3 (ref 0–0.05)
IMM GRANULOCYTES NFR BLD AUTO: 0.4 % (ref 0–0.5)
LYMPHOCYTES # BLD AUTO: 3.54 10*3/MM3 (ref 0.7–3.1)
LYMPHOCYTES NFR BLD AUTO: 27.9 % (ref 19.6–45.3)
MCH RBC QN AUTO: 31.3 PG (ref 26.6–33)
MCHC RBC AUTO-ENTMCNC: 33.8 G/DL (ref 31.5–35.7)
MCV RBC AUTO: 92.6 FL (ref 79–97)
MONOCYTES # BLD AUTO: 0.73 10*3/MM3 (ref 0.1–0.9)
MONOCYTES NFR BLD AUTO: 5.8 % (ref 5–12)
NEUTROPHILS NFR BLD AUTO: 62.2 % (ref 42.7–76)
NEUTROPHILS NFR BLD AUTO: 7.88 10*3/MM3 (ref 1.7–7)
NRBC BLD AUTO-RTO: 0 /100 WBC (ref 0–0.2)
PLATELET # BLD AUTO: 244 10*3/MM3 (ref 140–450)
PMV BLD AUTO: 10.9 FL (ref 6–12)
RBC # BLD AUTO: 3.77 10*6/MM3 (ref 3.77–5.28)
WBC NRBC COR # BLD: 12.67 10*3/MM3 (ref 3.4–10.8)

## 2023-10-27 PROCEDURE — 85025 COMPLETE CBC W/AUTO DIFF WBC: CPT | Performed by: OBSTETRICS & GYNECOLOGY

## 2023-10-27 PROCEDURE — 25010000002 KETOROLAC TROMETHAMINE PER 15 MG: Performed by: OBSTETRICS & GYNECOLOGY

## 2023-10-27 RX ADMIN — ACETAMINOPHEN 650 MG: 325 TABLET, FILM COATED ORAL at 13:30

## 2023-10-27 RX ADMIN — IBUPROFEN 600 MG: 600 TABLET, FILM COATED ORAL at 16:31

## 2023-10-27 RX ADMIN — ACETAMINOPHEN 1000 MG: 500 TABLET ORAL at 06:29

## 2023-10-27 RX ADMIN — DOCUSATE SODIUM 100 MG: 100 CAPSULE, LIQUID FILLED ORAL at 20:46

## 2023-10-27 RX ADMIN — ACETAMINOPHEN 650 MG: 325 TABLET, FILM COATED ORAL at 20:46

## 2023-10-27 RX ADMIN — KETOROLAC TROMETHAMINE 15 MG: 15 INJECTION, SOLUTION INTRAMUSCULAR; INTRAVENOUS at 10:25

## 2023-10-27 RX ADMIN — DOCUSATE SODIUM 100 MG: 100 CAPSULE, LIQUID FILLED ORAL at 09:23

## 2023-10-27 RX ADMIN — KETOROLAC TROMETHAMINE 15 MG: 15 INJECTION, SOLUTION INTRAMUSCULAR; INTRAVENOUS at 03:53

## 2023-10-27 RX ADMIN — IBUPROFEN 600 MG: 600 TABLET, FILM COATED ORAL at 22:07

## 2023-10-27 RX ADMIN — ACETAMINOPHEN 1000 MG: 500 TABLET ORAL at 00:24

## 2023-10-27 NOTE — LACTATION NOTE
This note was copied from a baby's chart.  P2, T bf first for 1 year with no supply issues. She reports this baby is feeding well and just bf 30/15 with a good latch. Referred to Postpartum handbook as needed and [facility] if she has any issues after d/c or if milk is not in by day 5. She has a PBP and did not have any concerns at this time. LC number is on board.

## 2023-10-27 NOTE — PLAN OF CARE
Goal Outcome Evaluation:           Progress: improving  Outcome Evaluation: VVS, F/C removed at 0520, DTV at 1120, ambulated to bathroom and moving well, breastfeeding, bonding very well with infant.

## 2023-10-27 NOTE — PROGRESS NOTES
[facility]   PROGRESS NOTE    Patient Name: Wilda Phelps  :  1990  MRN:  [id number]      Post-Op Day 1 S/P    Delivered a male infant.  Subjective     Patient reports:    Pain is well controlled. Voiding and ambulating without difficulty.    Tolerating po. Lochia normal.       The patient plans to breastfeed.         Objective       Vitals: Vital Signs Range for the last 24 hours  Temperature: Temp:  [96.6 °F (35.9 °C)-98.1 °F (36.7 °C)] 98.1 °F (36.7 °C)   Temp Source: Temp src: Oral   BP: BP: ()/() 102/57   Pulse: Heart Rate:  [46-80] 80   Respirations: Resp:  [16-17] 16         Intake/Output Summary (Last 24 hours) at 10/27/2023 0836  Last data filed at 10/27/2023 0514  Gross per 24 hour   Intake 496 ml   Output 1825 ml   Net -1329 ml                                              Physical Exam     General Alert and awake, in NAD      CV Regular rate and rhythm      Lungs clear to auscultation bilaterally        Abdomen Soft, non-distended, fundus firm,  2 below umbilicus, appropriately tender      Incision  Dressing clean, dry and intact.      Extremities  Trace edema, calves NT               LABS: Results from last 7 days   Lab Units 10/27/23  0608 10/26/23  0536   WBC 10*3/mm3 12.67* 13.51*   HEMOGLOBIN g/dL 11.8* 12.7   HEMATOCRIT % 34.9 37.8   PLATELETS 10*3/mm3 244 264         Prenatal labs results reviewed:  Yes   Rubella:  Immune  Rh Status:    RH type   Date Value Ref Range Status   10/26/2023 Negative  Final     Comment:     RhIG IS Indicated. Baby is Rh Positive                       Assessment & Plan   : 1. POD 1 S/P C/S: Hemodynamically stable.  Doing well.  Continue routine care. Desires circ for baby today. Would like d/c home tomorrow.      Pregnancy          Barb Ferrera, NATHEN  10/27/2023  08:36 EDT

## 2023-10-27 NOTE — LACTATION NOTE
This note was copied from a baby's chart.  Rounded on mom at this time. Reports baby is still BF well. Denies any questions.

## 2023-10-28 RX ADMIN — IBUPROFEN 600 MG: 600 TABLET, FILM COATED ORAL at 18:08

## 2023-10-28 RX ADMIN — IBUPROFEN 600 MG: 600 TABLET, FILM COATED ORAL at 04:25

## 2023-10-28 RX ADMIN — DOCUSATE SODIUM 100 MG: 100 CAPSULE, LIQUID FILLED ORAL at 19:59

## 2023-10-28 RX ADMIN — ACETAMINOPHEN 650 MG: 325 TABLET, FILM COATED ORAL at 10:20

## 2023-10-28 RX ADMIN — IBUPROFEN 600 MG: 600 TABLET, FILM COATED ORAL at 23:40

## 2023-10-28 RX ADMIN — IBUPROFEN 600 MG: 600 TABLET, FILM COATED ORAL at 12:07

## 2023-10-28 RX ADMIN — ACETAMINOPHEN 650 MG: 325 TABLET, FILM COATED ORAL at 22:17

## 2023-10-28 RX ADMIN — DOCUSATE SODIUM 100 MG: 100 CAPSULE, LIQUID FILLED ORAL at 10:20

## 2023-10-28 RX ADMIN — ACETAMINOPHEN 650 MG: 325 TABLET, FILM COATED ORAL at 02:25

## 2023-10-28 RX ADMIN — ACETAMINOPHEN 650 MG: 325 TABLET, FILM COATED ORAL at 16:27

## 2023-10-28 NOTE — PLAN OF CARE
Goal Outcome Evaluation:         Progressing well, voids without diff, passing flatus, breastfeeding, incision looks good, minimal bleeding, pain controlled.  Wants to go home today

## 2023-10-28 NOTE — LACTATION NOTE
Pt reports baby is BF good. She reports baby is jaundice and the DrShell Wanted to start some formula supplement. Encouraged to BF first before giving supplement. Pt denies questions. Encouraged to call LC as needed.    Lactation Consult Note    Evaluation Completed: 10/28/2023 13:34 EDT  Patient Name: Wilda Phelps  :  1990  MRN:  [id number]     REFERRAL  INFORMATION:                                         DELIVERY HISTORY:        Skin to skin initiation date/time:      Skin to skin end date/time:           MATERNAL ASSESSMENT:                               INFANT ASSESSMENT:  Information for the patient's :  Rufino Phelps [[id number]]   No past medical history on file.                                                                                                   MATERNAL INFANT FEEDING:                                                                       EQUIPMENT TYPE:                                 BREAST PUMPING:          LACTATION REFERRALS:

## 2023-10-28 NOTE — PROGRESS NOTES
[facility]   PROGRESS NOTE    Patient Name: Wilda Phelps  :  1990  MRN:  [id number]      Post-Op Day 2 S/P   Subjective     Patient reports:    Doing well. Pain well controlled. Tolerating po and having flatus. Voiding and ambulating without difficulty. Lochia normal. Infant well at bedside but per pt, Peds wants to keep infant inpatient due to concern for jaundice    Objective       Vitals: Vital Signs Range for the last 24 hours  Temperature: Temp:  [97.3 °F (36.3 °C)-98 °F (36.7 °C)] 97.3 °F (36.3 °C)       BP: BP: ()/(60-71) 113/71   Pulse: Heart Rate:  [50-76] 50                                                         Physical  Exam     Gen: Alert and awake    Abdomen: Soft, ND,  Fundus firm with minimal tenderness    Incision : Intact, without erythema or exudate    Extremities: Calves NT bilaterally       Lab Results   Component Value Date    WBC 12.67 (H) 10/27/2023    HGB 11.8 (L) 10/27/2023    HCT 34.9 10/27/2023    MCV 92.6 10/27/2023     10/27/2023              Assessment & Plan ]   Assessment:  POD 2  -  Doing well. Stable for discharge.   Rh negative - Infant Rh +, fetal screen neg, 1 vial of Rhogam indicated    Dispo: inpatient care, anticipate d/c home tomorrow          Pregnancy            Lakeshia Walsh MD  10/28/2023  13:08 EDT

## 2023-10-29 VITALS
HEART RATE: 65 BPM | SYSTOLIC BLOOD PRESSURE: 102 MMHG | HEIGHT: 66 IN | DIASTOLIC BLOOD PRESSURE: 64 MMHG | RESPIRATION RATE: 16 BRPM | TEMPERATURE: 97.9 F | OXYGEN SATURATION: 99 % | BODY MASS INDEX: 25.65 KG/M2 | WEIGHT: 159.6 LBS

## 2023-10-29 RX ORDER — IBUPROFEN 800 MG/1
800 TABLET ORAL EVERY 8 HOURS PRN
Qty: 40 TABLET | Refills: 0 | Status: SHIPPED | OUTPATIENT
Start: 2023-10-29

## 2023-10-29 RX ADMIN — IBUPROFEN 600 MG: 600 TABLET, FILM COATED ORAL at 06:18

## 2023-10-29 RX ADMIN — ACETAMINOPHEN 650 MG: 325 TABLET, FILM COATED ORAL at 04:26

## 2023-10-29 RX ADMIN — ACETAMINOPHEN 650 MG: 325 TABLET, FILM COATED ORAL at 11:06

## 2023-10-29 NOTE — DISCHARGE SUMMARY
Patient Name: Wilda Phelps  :  1990  MRN:  [id number]    Date of Discharge:  10/29/2023    Discharge Diagnosis:   Pregnancy      Presenting Problem/History of Present Illness  Pregnancy [Z34.90]       Hospital Course  Patient is a 33 y.o. female presented for repeat  delivery.  Delivery and postpartum course was uncomplicated.      Procedures Performed  Procedure(s):   SECTION REPEAT           Condition on Discharge:  Post-Op Day 3 S/P   Subjective     Patient reports:    Doing well - ready for discharge. Pain well controlled. Tolerating po and   having flatus. Voiding and ambulating without difficulty. Lochia normal.       Vital Signs  Temp:  [97.9 °F (36.6 °C)-98 °F (36.7 °C)] 97.9 °F (36.6 °C)  Heart Rate:  [51-67] 65  Resp:  [16-18] 16  BP: (102-121)/(64-69) 102/64    Physical Exam:     Gen: Alert and awake   Abdomen: Soft, ND,  Fundus firm with minimal tenderness   Incision : Intact, without erythema or exudate   Extremities: Calves NT bilaterally         Results from last 7 days   Lab Units 10/27/23  0608 10/26/23  0536   WBC 10*3/mm3 12.67* 13.51*   HEMOGLOBIN g/dL 11.8* 12.7   HEMATOCRIT % 34.9 37.8   PLATELETS 10*3/mm3 244 264             POD 3 -     Consults:   Consults       No orders found from 2023 to 10/27/2023.            Discharge Disposition  Home or Self Care    Discharge Medications     Discharge Medications        Continue These Medications        Instructions Start Date   ibuprofen 800 MG tablet  Commonly known as: ADVIL,MOTRIN   Take 1 tablet by mouth Every 8 (Eight) Hours As Needed for Mild Pain.             ASK your doctor about these medications        Instructions Start Date   albuterol sulfate  (90 Base) MCG/ACT inhaler  Commonly known as: PROVENTIL HFA;VENTOLIN HFA;PROAIR HFA   2 puffs, Inhalation, Every 4 Hours PRN      HYDROcodone-acetaminophen 5-325 MG per tablet  Commonly known as: NORCO   1 tablet, Oral      prenatal (CLASSIC) vitamin  28-0.8 MG tablet tablet  Generic drug: prenatal vitamin   1 tablet, Oral, Daily               The patient has been prescribed a controlled substance.  She has been counseled on the risks associated with using the medication.   The addictive potential of this medication and alternatives were discussed carefully with this patient and she demonstrated understanding.  A ABDIFATAH report has been obtained and reviewed.         Activity at Discharge:     Follow-up Appointments  No future appointments.      Test Results Pending at Discharge       Yeni Pantoja MD  10/29/23  10:28 EDT

## 2023-10-29 NOTE — PLAN OF CARE
Goal Outcome Evaluation:         Progressing well, voids without diff, passing flatus, pain controled, minimal bleeding, breastfeeding

## 2023-10-30 NOTE — L&D DELIVERY NOTE
[facility]   Section Operative Note    Patient Name: Wilda Phelps  :  1990  MRN:  [id number]      Date of procedure:  10/26/2023        Pre-Operative Dx:   1.  IUP at 39w0d weeks   2. Prior C/S  desires repeat        Postoperative Dx:  Same        Procedure: repeat    via Pfannensteil      Surgeon: Whit Julio MD      Assistant: Dr burris     Anesthesia: Spinal      EBL:    Quantitative EBL:  300cc    Quantitative Blood Loss (mL): 166 mL         Specimens: None     Findings:                           Amniotic Fluid nl  Placenta Intact, 3 VC  Uterus normal  Tubes and ovaries normal bilaterally              Infant:           Gender: Viable male  infant     Weight: 2860 g (6 lb 4.9 oz)     Apgars: 8  @ 1 minute /     9  @ 5 minutes                 Indication for C/Section:     Prior C/S               Procedure Details:  The patient was taken to the operating room where spinal anesthesia was found to be adequate. She was prepped and draped in the usual sterile manner in the dorsal supine position with leftward tilt. A Pfannenstiel incision was made and carried down to the fascia. The fascia was incised in the mid-line and extended transversely with Hilario scissors. The fascia was grasped and elevated and  from the underlying rectus muscles superiorly and inferiorly. The peritoneum was identified and entered. Peritoneal incision was extended superiorly and inferiorly with good visualization of the bladder. The bladder blade was inserted and the vesicouterine peritoneum was incised transversely and the bladder flap was created digitally. The bladder blade was reinserted. The  lower uterine segment was incised in a transverse fashion.  The head was elevated and delivered through the incision. The remainder of the infant was delivered without difficulty. The umbilical cord was clamped and cut and the infant was handed off the field. Cord ph and cord bloods were obtained. The  placenta was removed intact and appeared normal. The uterine incision was closed with running locked sutures of 0vicryl. Second layer closure was placed imbricating the first for hemostasis. The abdominal cavity was irrigated and cleared of all clot and debris. The uterine incision was inspected and noted to be hemostatic. Rectus muscles were reapproximated in the midline with 0 vicryl.  The fascia was closed with 0 vicryl in running continuous fashion. The subcutaneous tissue was closed with 3-0 vicryl. The skin was closed in subcuticular fashion with 4-0 Vicryl.   Instrument, sponge, and needle counts were correct prior the abdominal closure and at the conclusion of the case. Mother and baby  to recovery room in stable condition.              Complications:     None          Antibiotics: cefazolin (Ancef)                      Whit Julio MD  10/30/2023  11:00 EDT

## (undated) DEVICE — Device: Brand: PORTEX

## (undated) DEVICE — ANTIBACTERIAL UNDYED BRAIDED (POLYGLACTIN 910), SYNTHETIC ABSORBABLE SUTURE: Brand: COATED VICRYL

## (undated) DEVICE — GLV SURG BIOGEL LTX PF 6 1/2

## (undated) DEVICE — SOL IRR H2O BTL 1000ML STRL

## (undated) DEVICE — NDL BLNT 18G 1 1/2IN

## (undated) DEVICE — 3M(TM) TEGADERM(TM) TRANSPARENT FILM DRESSING FRAME STYLE 1627: Brand: 3M™ TEGADERM™

## (undated) DEVICE — STRIP SKIN CLOSEURE PROXI 1X5IN

## (undated) DEVICE — SUT VIC PLS 0 CTX 36IN UD VCP978H

## (undated) DEVICE — KENDALL SCD EXPRESS SLEEVES, KNEE LENGTH, MEDIUM: Brand: KENDALL SCD

## (undated) DEVICE — KT ART BLD GAS QUICK DRAW